# Patient Record
Sex: FEMALE | Race: OTHER | Employment: FULL TIME | URBAN - METROPOLITAN AREA
[De-identification: names, ages, dates, MRNs, and addresses within clinical notes are randomized per-mention and may not be internally consistent; named-entity substitution may affect disease eponyms.]

---

## 2017-07-25 ENCOUNTER — TELEPHONE (OUTPATIENT)
Dept: OBGYN CLINIC | Facility: CLINIC | Age: 32
End: 2017-07-25

## 2017-07-25 NOTE — TELEPHONE ENCOUNTER
Pt is requesting a refill request on her b/c   Pt was up set that because that next available appt at 820am was till sep

## 2017-08-14 DIAGNOSIS — Z30.40 CONTRACEPTIVE SURVEILLANCE: ICD-10-CM

## 2017-08-15 RX ORDER — ETONOGESTREL/ETHINYL ESTRADIOL .12-.015MG
RING, VAGINAL VAGINAL
Qty: 1 EACH | Refills: 0 | Status: SHIPPED | OUTPATIENT
Start: 2017-08-15 | End: 2018-06-27

## 2018-06-26 ENCOUNTER — APPOINTMENT (OUTPATIENT)
Dept: LAB | Facility: HOSPITAL | Age: 33
End: 2018-06-26
Attending: OBSTETRICS & GYNECOLOGY
Payer: COMMERCIAL

## 2018-06-26 ENCOUNTER — TELEPHONE (OUTPATIENT)
Dept: OBGYN CLINIC | Facility: CLINIC | Age: 33
End: 2018-06-26

## 2018-06-26 DIAGNOSIS — O20.9 BLEEDING IN EARLY PREGNANCY: ICD-10-CM

## 2018-06-26 DIAGNOSIS — O20.9 BLEEDING IN EARLY PREGNANCY: Primary | ICD-10-CM

## 2018-06-26 PROCEDURE — 86850 RBC ANTIBODY SCREEN: CPT

## 2018-06-26 PROCEDURE — 84702 CHORIONIC GONADOTROPIN TEST: CPT

## 2018-06-26 PROCEDURE — 86900 BLOOD TYPING SEROLOGIC ABO: CPT

## 2018-06-26 PROCEDURE — 86901 BLOOD TYPING SEROLOGIC RH(D): CPT

## 2018-06-26 PROCEDURE — 36415 COLL VENOUS BLD VENIPUNCTURE: CPT

## 2018-06-26 NOTE — TELEPHONE ENCOUNTER
Pt about 5w2d based on LMP of 5/20/18 and calling to report spotting and cramping today. Pt stated she took a pregnancy test last week and it was positive. Pt stated that spotting started this morning and it was brown in color just when wiping.  Pt stated t

## 2018-06-27 ENCOUNTER — OFFICE VISIT (OUTPATIENT)
Dept: OBGYN CLINIC | Facility: CLINIC | Age: 33
End: 2018-06-27

## 2018-06-27 VITALS
DIASTOLIC BLOOD PRESSURE: 89 MMHG | SYSTOLIC BLOOD PRESSURE: 128 MMHG | BODY MASS INDEX: 40 KG/M2 | WEIGHT: 260 LBS | HEART RATE: 79 BPM

## 2018-06-27 DIAGNOSIS — O02.81 CHEMICAL PREGNANCY: Primary | ICD-10-CM

## 2018-06-27 PROCEDURE — 99212 OFFICE O/P EST SF 10 MIN: CPT | Performed by: OBSTETRICS & GYNECOLOGY

## 2018-06-27 NOTE — PROGRESS NOTES
Judi Nguyễn    6/7/1985       Patient presents with:  Gyn Problem: positive home pregnancy test(light bleeding)  Pt had a +HPT on Friday 6/22/18 and began spotting and cramping yesterday.   Her quant yesterday was only 0.9 and I  Explained that it wa

## 2019-01-21 ENCOUNTER — TELEPHONE (OUTPATIENT)
Dept: OBGYN CLINIC | Facility: CLINIC | Age: 34
End: 2019-01-21

## 2019-01-21 NOTE — TELEPHONE ENCOUNTER
Pt confirms +hpt and LMP 12/8/18. Informed pt of alternating PN appts with all 6 providers, 2 male and 4 female. Pt states she prefers to only see the female doctors and asking if this is possible.  Informed pt not with our group since we have 2 male physic No

## 2019-01-21 NOTE — TELEPHONE ENCOUNTER
PER PT REQUESTING AN APPT / LMP PT DO NOT WANT TO GIVE THAT INFO TO ME / CONFIRMED WITH PREGNANCY TEST / PLS ADV

## 2019-02-09 ENCOUNTER — LAB ENCOUNTER (OUTPATIENT)
Dept: LAB | Facility: HOSPITAL | Age: 34
End: 2019-02-09
Attending: OBSTETRICS & GYNECOLOGY
Payer: COMMERCIAL

## 2019-02-09 ENCOUNTER — NURSE ONLY (OUTPATIENT)
Dept: OBGYN CLINIC | Facility: CLINIC | Age: 34
End: 2019-02-09
Payer: COMMERCIAL

## 2019-02-09 VITALS — BODY MASS INDEX: 39.71 KG/M2 | HEIGHT: 68 IN | WEIGHT: 262 LBS

## 2019-02-09 DIAGNOSIS — Z34.01 ENCOUNTER FOR SUPERVISION OF NORMAL FIRST PREGNANCY IN FIRST TRIMESTER: ICD-10-CM

## 2019-02-09 DIAGNOSIS — Z34.01 ENCOUNTER FOR SUPERVISION OF NORMAL FIRST PREGNANCY IN FIRST TRIMESTER: Primary | ICD-10-CM

## 2019-02-09 LAB
ANTIBODY SCREEN: NEGATIVE
BASOPHILS # BLD AUTO: 0.02 X10(3) UL (ref 0–0.2)
BASOPHILS NFR BLD AUTO: 0.2 %
CONTROL LINE PRESENT WITH A CLEAR BACKGROUND (YES/NO): YES YES/NO
DEPRECATED RDW RBC AUTO: 44.6 FL (ref 35.1–46.3)
EOSINOPHIL # BLD AUTO: 0.04 X10(3) UL (ref 0–0.7)
EOSINOPHIL NFR BLD AUTO: 0.4 %
ERYTHROCYTE [DISTWIDTH] IN BLOOD BY AUTOMATED COUNT: 15.6 % (ref 11–15)
GLUCOSE 1H P 50 G GLC PO SERPL-MCNC: 131 MG/DL
HCT VFR BLD AUTO: 39.3 % (ref 35–48)
HGB BLD-MCNC: 12.3 G/DL (ref 12–16)
IMM GRANULOCYTES # BLD AUTO: 0.04 X10(3) UL (ref 0–1)
IMM GRANULOCYTES NFR BLD: 0.4 %
KIT LOT #: NORMAL NUMERIC
LYMPHOCYTES # BLD AUTO: 1.84 X10(3) UL (ref 1–4)
LYMPHOCYTES NFR BLD AUTO: 18.8 %
MCH RBC QN AUTO: 24.7 PG (ref 26–34)
MCHC RBC AUTO-ENTMCNC: 31.3 G/DL (ref 31–37)
MCV RBC AUTO: 79.1 FL (ref 80–100)
MONOCYTES # BLD AUTO: 0.6 X10(3) UL (ref 0.1–1)
MONOCYTES NFR BLD AUTO: 6.1 %
NEUTROPHILS # BLD AUTO: 7.26 X10 (3) UL (ref 1.5–7.7)
NEUTROPHILS # BLD AUTO: 7.26 X10(3) UL (ref 1.5–7.7)
NEUTROPHILS NFR BLD AUTO: 74.1 %
PLATELET # BLD AUTO: 327 10(3)UL (ref 150–450)
PREGNANCY TEST, URINE: POSITIVE
RBC # BLD AUTO: 4.97 X10(6)UL (ref 3.8–5.3)
RH BLOOD TYPE: POSITIVE
RUBV IGG SER-ACNC: 450.6 IU/ML
WBC # BLD AUTO: 9.8 X10(3) UL (ref 4–11)

## 2019-02-09 PROCEDURE — 87086 URINE CULTURE/COLONY COUNT: CPT

## 2019-02-09 PROCEDURE — 86762 RUBELLA ANTIBODY: CPT

## 2019-02-09 PROCEDURE — 87389 HIV-1 AG W/HIV-1&-2 AB AG IA: CPT

## 2019-02-09 PROCEDURE — 87340 HEPATITIS B SURFACE AG IA: CPT

## 2019-02-09 PROCEDURE — 86780 TREPONEMA PALLIDUM: CPT

## 2019-02-09 PROCEDURE — 86803 HEPATITIS C AB TEST: CPT

## 2019-02-09 PROCEDURE — 82950 GLUCOSE TEST: CPT

## 2019-02-09 PROCEDURE — 36415 COLL VENOUS BLD VENIPUNCTURE: CPT

## 2019-02-09 PROCEDURE — 86900 BLOOD TYPING SEROLOGIC ABO: CPT

## 2019-02-09 PROCEDURE — 85025 COMPLETE CBC W/AUTO DIFF WBC: CPT

## 2019-02-09 PROCEDURE — 86850 RBC ANTIBODY SCREEN: CPT

## 2019-02-09 PROCEDURE — 86901 BLOOD TYPING SEROLOGIC RH(D): CPT

## 2019-02-09 PROCEDURE — 81025 URINE PREGNANCY TEST: CPT | Performed by: OBSTETRICS & GYNECOLOGY

## 2019-02-09 RX ORDER — GLYCERIN/MINERAL OIL
LOTION (ML) TOPICAL
COMMUNITY
End: 2020-10-05

## 2019-02-09 NOTE — PROGRESS NOTES
Pt seen for OBN appt today with no complaints. Normal PN labs ordered, 1 hr gtt and Hep C. Pt advised all labs must be completed and resulted prior to MD appt. Pt walked to  to schedule NPN appt with MD.    UPT positive in the office.      Part genetic or chromosomal disorders No    Patient or baby's father had a child with birth defects not listed above No    Previous miscarriages or stillborn Yes Pt's grandma had a miscarriage       MISC    Infant vaccinations  Yes

## 2019-02-11 LAB
HBV SURFACE AG SERPL QL IA: NONREACTIVE
HCV AB SERPL QL IA: NONREACTIVE
HIV1+2 AB SERPL QL IA: NONREACTIVE
T PALLIDUM AB SER QL: NEGATIVE

## 2019-02-14 ENCOUNTER — INITIAL PRENATAL (OUTPATIENT)
Dept: OBGYN CLINIC | Facility: CLINIC | Age: 34
End: 2019-02-14
Payer: COMMERCIAL

## 2019-02-14 VITALS
WEIGHT: 262 LBS | BODY MASS INDEX: 40 KG/M2 | HEART RATE: 81 BPM | SYSTOLIC BLOOD PRESSURE: 121 MMHG | DIASTOLIC BLOOD PRESSURE: 81 MMHG

## 2019-02-14 DIAGNOSIS — Z34.91 ENCOUNTER FOR SUPERVISION OF NORMAL PREGNANCY IN FIRST TRIMESTER, UNSPECIFIED GRAVIDITY: Primary | ICD-10-CM

## 2019-02-14 LAB
MULTISTIX LOT#: NORMAL NUMERIC
PH, URINE: 8 (ref 4.5–8)
SPECIFIC GRAVITY: 1.01 (ref 1–1.03)

## 2019-02-14 PROCEDURE — 76815 OB US LIMITED FETUS(S): CPT | Performed by: OBSTETRICS & GYNECOLOGY

## 2019-02-14 PROCEDURE — 90686 IIV4 VACC NO PRSV 0.5 ML IM: CPT | Performed by: OBSTETRICS & GYNECOLOGY

## 2019-02-14 PROCEDURE — 90471 IMMUNIZATION ADMIN: CPT | Performed by: OBSTETRICS & GYNECOLOGY

## 2019-02-14 PROCEDURE — 81002 URINALYSIS NONAUTO W/O SCOPE: CPT | Performed by: OBSTETRICS & GYNECOLOGY

## 2019-02-14 NOTE — PROGRESS NOTES
Per JLK, administered Flu on L. Deltoid. Pt tolerated well. VIS given. Advised to call with questions or concerns. Pt verbalized understanding.

## 2019-02-15 LAB
C TRACH DNA SPEC QL NAA+PROBE: NEGATIVE
HPV I/H RISK 1 DNA SPEC QL NAA+PROBE: NEGATIVE
N GONORRHOEA DNA SPEC QL NAA+PROBE: NEGATIVE
T VAGINALIS RRNA SPEC QL NAA+PROBE: NEGATIVE

## 2019-03-14 ENCOUNTER — ROUTINE PRENATAL (OUTPATIENT)
Dept: OBGYN CLINIC | Facility: CLINIC | Age: 34
End: 2019-03-14
Payer: COMMERCIAL

## 2019-03-14 VITALS
HEART RATE: 81 BPM | DIASTOLIC BLOOD PRESSURE: 79 MMHG | SYSTOLIC BLOOD PRESSURE: 122 MMHG | BODY MASS INDEX: 40 KG/M2 | WEIGHT: 261 LBS

## 2019-03-14 DIAGNOSIS — Z34.92 ENCOUNTER FOR SUPERVISION OF NORMAL PREGNANCY IN SECOND TRIMESTER, UNSPECIFIED GRAVIDITY: Primary | ICD-10-CM

## 2019-03-14 LAB
MULTISTIX LOT#: NORMAL NUMERIC
PH, URINE: 6 (ref 4.5–8)
SPECIFIC GRAVITY: 1.03 (ref 1–1.03)
UROBILINOGEN,SEMI-QN: 0.2 MG/DL (ref 0–1.9)

## 2019-03-14 PROCEDURE — 81002 URINALYSIS NONAUTO W/O SCOPE: CPT | Performed by: OBSTETRICS & GYNECOLOGY

## 2019-04-11 ENCOUNTER — TELEPHONE (OUTPATIENT)
Dept: OBGYN CLINIC | Facility: CLINIC | Age: 34
End: 2019-04-11

## 2019-04-11 ENCOUNTER — ROUTINE PRENATAL (OUTPATIENT)
Dept: OBGYN CLINIC | Facility: CLINIC | Age: 34
End: 2019-04-11
Payer: COMMERCIAL

## 2019-04-11 VITALS
DIASTOLIC BLOOD PRESSURE: 77 MMHG | HEART RATE: 76 BPM | BODY MASS INDEX: 40 KG/M2 | WEIGHT: 263 LBS | SYSTOLIC BLOOD PRESSURE: 120 MMHG

## 2019-04-11 DIAGNOSIS — Z34.92 ENCOUNTER FOR SUPERVISION OF NORMAL PREGNANCY IN SECOND TRIMESTER, UNSPECIFIED GRAVIDITY: Primary | ICD-10-CM

## 2019-04-11 PROCEDURE — 81002 URINALYSIS NONAUTO W/O SCOPE: CPT | Performed by: OBSTETRICS & GYNECOLOGY

## 2019-04-12 NOTE — TELEPHONE ENCOUNTER
Lmtcb. Please relay info:    Patient's order for level 2 ultrasound was entered. Patient can call maternal fetal medicine at [phone] to schedule an appointment. Detailed my chart message routed.

## 2019-04-29 ENCOUNTER — HOSPITAL ENCOUNTER (OUTPATIENT)
Dept: PERINATAL CARE | Facility: HOSPITAL | Age: 34
Discharge: HOME | End: 2019-04-29
Attending: OBSTETRICS & GYNECOLOGY
Payer: COMMERCIAL

## 2019-04-29 VITALS
WEIGHT: 263 LBS | HEIGHT: 68 IN | BODY MASS INDEX: 39.86 KG/M2 | SYSTOLIC BLOOD PRESSURE: 127 MMHG | DIASTOLIC BLOOD PRESSURE: 76 MMHG | HEART RATE: 80 BPM

## 2019-04-29 DIAGNOSIS — Z36.3 ENCOUNTER FOR ANTENATAL SCREENING FOR MALFORMATION USING ULTRASOUND: ICD-10-CM

## 2019-04-29 DIAGNOSIS — O99.212 OBESITY AFFECTING PREGNANCY IN SECOND TRIMESTER: ICD-10-CM

## 2019-04-29 PROCEDURE — 76811 OB US DETAILED SNGL FETUS: CPT | Performed by: OBSTETRICS & GYNECOLOGY

## 2019-04-29 PROCEDURE — 99243 OFF/OP CNSLTJ NEW/EST LOW 30: CPT | Performed by: OBSTETRICS & GYNECOLOGY

## 2019-04-29 NOTE — PROGRESS NOTES
Outpatient Maternal-Fetal Medicine Consultation    Dear Dr. Holly Rolon    Thank you for requesting ultrasound evaluation and maternal fetal medicine consultation on your patient Julia Fabio.  As you are aware she is a 29year old female [facility] with a single patient. DISCUSSION  During her visit we discussed and reviewed the following issues:  OBESITY  This patient has obesity; her pre-gravid BMI is: 39  Obesity during pregnancy is associated with numerous maternal and  risks.   It is not clear whet Studies have found that the increased risk of  birth in obese gravidas is primarily associated with obesity-related medical and  complications, rather than an intrinsic predisposition to spontaneous  birth.  Prevention of  bi M.D.    The majority of the time (>50%) was spent in review of records, consultation and coordination of care. Our discussion is summarized above. The approximate physician face-to-face time was 40 minutes.

## 2019-05-10 ENCOUNTER — ROUTINE PRENATAL (OUTPATIENT)
Dept: OBGYN CLINIC | Facility: CLINIC | Age: 34
End: 2019-05-10
Payer: COMMERCIAL

## 2019-05-10 VITALS
BODY MASS INDEX: 40 KG/M2 | WEIGHT: 263.38 LBS | HEART RATE: 74 BPM | DIASTOLIC BLOOD PRESSURE: 82 MMHG | SYSTOLIC BLOOD PRESSURE: 128 MMHG

## 2019-05-10 DIAGNOSIS — Z34.92 ENCOUNTER FOR SUPERVISION OF NORMAL PREGNANCY IN SECOND TRIMESTER, UNSPECIFIED GRAVIDITY: Primary | ICD-10-CM

## 2019-05-10 PROCEDURE — 81002 URINALYSIS NONAUTO W/O SCOPE: CPT | Performed by: OBSTETRICS & GYNECOLOGY

## 2019-05-17 NOTE — PROGRESS NOTES
Magdalena Peralta  Dear Dr. Natividad Meek,     Thank you for requesting ultrasound evaluation and maternal fetal medicine consultation on your patient Judi Nguyễn.  As you are aware she is a 32/30 year old female  with a Single found that initiating moderate exercise in early pregnancy for obese gravidas significantly reduced the incidence of gestational diabetes, gestational hypertension,  deliveries and C-sections.       IMPRESSION:  · IUP at 23w6d  · Obesity (BMI 39)  ·

## 2019-05-24 ENCOUNTER — HOSPITAL ENCOUNTER (OUTPATIENT)
Dept: PERINATAL CARE | Facility: HOSPITAL | Age: 34
Discharge: HOME | End: 2019-05-24
Attending: OBSTETRICS & GYNECOLOGY
Payer: COMMERCIAL

## 2019-05-24 VITALS
DIASTOLIC BLOOD PRESSURE: 71 MMHG | SYSTOLIC BLOOD PRESSURE: 123 MMHG | BODY MASS INDEX: 40 KG/M2 | WEIGHT: 264 LBS | HEART RATE: 72 BPM

## 2019-05-24 DIAGNOSIS — O99.212 OBESITY AFFECTING PREGNANCY IN SECOND TRIMESTER: ICD-10-CM

## 2019-05-24 DIAGNOSIS — O99.212 OBESITY AFFECTING PREGNANCY IN SECOND TRIMESTER: Primary | ICD-10-CM

## 2019-05-24 PROCEDURE — 76825 ECHO EXAM OF FETAL HEART: CPT | Performed by: OBSTETRICS & GYNECOLOGY

## 2019-05-24 PROCEDURE — 76827 ECHO EXAM OF FETAL HEART: CPT | Performed by: OBSTETRICS & GYNECOLOGY

## 2019-05-24 PROCEDURE — 99214 OFFICE O/P EST MOD 30 MIN: CPT | Performed by: OBSTETRICS & GYNECOLOGY

## 2019-05-24 PROCEDURE — 93325 DOPPLER ECHO COLOR FLOW MAPG: CPT | Performed by: OBSTETRICS & GYNECOLOGY

## 2019-05-24 NOTE — ADDENDUM NOTE
Encounter addended by: Anastasia Zimmer on: 5/24/2019 10:44 AM   Actions taken: Charge Capture section accepted

## 2019-06-06 ENCOUNTER — ROUTINE PRENATAL (OUTPATIENT)
Dept: OBGYN CLINIC | Facility: CLINIC | Age: 34
End: 2019-06-06
Payer: COMMERCIAL

## 2019-06-06 VITALS
BODY MASS INDEX: 40 KG/M2 | SYSTOLIC BLOOD PRESSURE: 124 MMHG | HEART RATE: 76 BPM | DIASTOLIC BLOOD PRESSURE: 79 MMHG | WEIGHT: 265 LBS

## 2019-06-06 DIAGNOSIS — Z34.92 ENCOUNTER FOR SUPERVISION OF NORMAL PREGNANCY IN SECOND TRIMESTER, UNSPECIFIED GRAVIDITY: Primary | ICD-10-CM

## 2019-06-06 PROCEDURE — 81002 URINALYSIS NONAUTO W/O SCOPE: CPT | Performed by: OBSTETRICS & GYNECOLOGY

## 2019-06-06 NOTE — PROGRESS NOTES
No issues. Normal fetal echo. [facility] recs growth at 32 wks and NSTs at 36 wks. Has 2T labs. RTC @ 28 wks.

## 2019-06-08 ENCOUNTER — APPOINTMENT (OUTPATIENT)
Dept: LAB | Facility: HOSPITAL | Age: 34
End: 2019-06-08
Attending: OBSTETRICS & GYNECOLOGY
Payer: COMMERCIAL

## 2019-06-08 DIAGNOSIS — Z34.92 ENCOUNTER FOR SUPERVISION OF NORMAL PREGNANCY IN SECOND TRIMESTER, UNSPECIFIED GRAVIDITY: ICD-10-CM

## 2019-06-08 PROCEDURE — 36415 COLL VENOUS BLD VENIPUNCTURE: CPT

## 2019-06-08 PROCEDURE — 85027 COMPLETE CBC AUTOMATED: CPT

## 2019-06-08 PROCEDURE — 82950 GLUCOSE TEST: CPT

## 2019-06-18 ENCOUNTER — ROUTINE PRENATAL (OUTPATIENT)
Dept: OBGYN CLINIC | Facility: CLINIC | Age: 34
End: 2019-06-18
Payer: COMMERCIAL

## 2019-06-18 VITALS
HEART RATE: 73 BPM | BODY MASS INDEX: 40 KG/M2 | SYSTOLIC BLOOD PRESSURE: 109 MMHG | DIASTOLIC BLOOD PRESSURE: 73 MMHG | WEIGHT: 266 LBS

## 2019-06-18 DIAGNOSIS — Z34.82 ENCOUNTER FOR SUPERVISION OF OTHER NORMAL PREGNANCY IN SECOND TRIMESTER: Primary | ICD-10-CM

## 2019-06-18 PROBLEM — O99.212 OBESITY AFFECTING PREGNANCY IN SECOND TRIMESTER: Status: RESOLVED | Noted: 2019-04-29 | Resolved: 2019-06-18

## 2019-06-18 PROBLEM — O99.212 OBESITY AFFECTING PREGNANCY IN SECOND TRIMESTER (HCC): Status: RESOLVED | Noted: 2019-04-29 | Resolved: 2019-06-18

## 2019-06-18 PROCEDURE — 90715 TDAP VACCINE 7 YRS/> IM: CPT | Performed by: OBSTETRICS & GYNECOLOGY

## 2019-06-18 PROCEDURE — 90471 IMMUNIZATION ADMIN: CPT | Performed by: OBSTETRICS & GYNECOLOGY

## 2019-06-18 PROCEDURE — 81002 URINALYSIS NONAUTO W/O SCOPE: CPT | Performed by: OBSTETRICS & GYNECOLOGY

## 2019-06-18 NOTE — PROGRESS NOTES
Tdap vaccine administered to pts right deltoid. Pt tolerated injection well. VIS info sheet regarding Tdap given to pt and encouraged to call with any questions or concerns.

## 2019-07-08 ENCOUNTER — ROUTINE PRENATAL (OUTPATIENT)
Dept: OBGYN CLINIC | Facility: CLINIC | Age: 34
End: 2019-07-08
Payer: COMMERCIAL

## 2019-07-08 VITALS
DIASTOLIC BLOOD PRESSURE: 77 MMHG | SYSTOLIC BLOOD PRESSURE: 115 MMHG | HEART RATE: 75 BPM | BODY MASS INDEX: 41 KG/M2 | WEIGHT: 269 LBS

## 2019-07-08 DIAGNOSIS — Z34.93 ENCOUNTER FOR SUPERVISION OF NORMAL PREGNANCY IN THIRD TRIMESTER, UNSPECIFIED GRAVIDITY: Primary | ICD-10-CM

## 2019-07-08 DIAGNOSIS — O99.213 OBESITY AFFECTING PREGNANCY IN THIRD TRIMESTER: ICD-10-CM

## 2019-07-08 PROBLEM — O99.013 ANEMIA DURING PREGNANCY IN THIRD TRIMESTER (HCC): Status: ACTIVE | Noted: 2019-07-08

## 2019-07-08 PROBLEM — O99.013 ANEMIA DURING PREGNANCY IN THIRD TRIMESTER: Status: ACTIVE | Noted: 2019-07-08

## 2019-07-08 LAB
APPEARANCE: CLEAR
MULTISTIX LOT#: NORMAL NUMERIC
SPECIFIC GRAVITY: 1.02 (ref 1–1.03)
URINE-COLOR: YELLOW

## 2019-07-08 PROCEDURE — 81002 URINALYSIS NONAUTO W/O SCOPE: CPT | Performed by: OBSTETRICS & GYNECOLOGY

## 2019-07-17 NOTE — PROGRESS NOTES
Aileen Bautista    Dear Dr. Alise Gibson    Thank you for requesting an ultrasound and maternal-fetal medicine consultation on your patient Catarina Diamond.  As you are aware she is a 36year old female [facility] with a reed pregna

## 2019-07-22 ENCOUNTER — HOSPITAL ENCOUNTER (OUTPATIENT)
Dept: PERINATAL CARE | Facility: HOSPITAL | Age: 34
Discharge: HOME | End: 2019-07-22
Attending: OBSTETRICS & GYNECOLOGY
Payer: COMMERCIAL

## 2019-07-22 VITALS
HEIGHT: 68 IN | BODY MASS INDEX: 40.77 KG/M2 | SYSTOLIC BLOOD PRESSURE: 114 MMHG | DIASTOLIC BLOOD PRESSURE: 74 MMHG | HEART RATE: 80 BPM | WEIGHT: 269 LBS

## 2019-07-22 DIAGNOSIS — O99.213 OBESITY AFFECTING PREGNANCY IN THIRD TRIMESTER: Primary | ICD-10-CM

## 2019-07-22 DIAGNOSIS — O99.213 OBESITY AFFECTING PREGNANCY IN THIRD TRIMESTER: ICD-10-CM

## 2019-07-22 PROCEDURE — 76819 FETAL BIOPHYS PROFIL W/O NST: CPT | Performed by: OBSTETRICS & GYNECOLOGY

## 2019-07-22 PROCEDURE — 76816 OB US FOLLOW-UP PER FETUS: CPT | Performed by: OBSTETRICS & GYNECOLOGY

## 2019-07-22 PROCEDURE — 99213 OFFICE O/P EST LOW 20 MIN: CPT | Performed by: OBSTETRICS & GYNECOLOGY

## 2019-07-22 NOTE — ADDENDUM NOTE
Encounter addended by: Henna Vazquez on: 7/22/2019 4:18 PM   Actions taken: Charge Capture section accepted

## 2019-07-23 ENCOUNTER — ROUTINE PRENATAL (OUTPATIENT)
Dept: OBGYN CLINIC | Facility: CLINIC | Age: 34
End: 2019-07-23
Payer: COMMERCIAL

## 2019-07-23 ENCOUNTER — TELEPHONE (OUTPATIENT)
Dept: OBGYN CLINIC | Facility: CLINIC | Age: 34
End: 2019-07-23

## 2019-07-23 VITALS
SYSTOLIC BLOOD PRESSURE: 109 MMHG | DIASTOLIC BLOOD PRESSURE: 76 MMHG | WEIGHT: 270 LBS | HEART RATE: 76 BPM | BODY MASS INDEX: 41 KG/M2

## 2019-07-23 DIAGNOSIS — Z34.93 ENCOUNTER FOR SUPERVISION OF NORMAL PREGNANCY IN THIRD TRIMESTER, UNSPECIFIED GRAVIDITY: Primary | ICD-10-CM

## 2019-07-23 LAB
APPEARANCE: CLEAR
MULTISTIX LOT#: NORMAL NUMERIC
PH, URINE: 7 (ref 4.5–8)
SPECIFIC GRAVITY: 1 (ref 1–1.03)
URINE-COLOR: YELLOW

## 2019-07-23 PROCEDURE — 81002 URINALYSIS NONAUTO W/O SCOPE: CPT | Performed by: OBSTETRICS & GYNECOLOGY

## 2019-07-23 NOTE — TELEPHONE ENCOUNTER
Forms dropped off for completion for [facility]  FCR/MAYANK have been signed  Payment has been received    Please fax/ pt will  originals

## 2019-07-23 NOTE — PROGRESS NOTES
No complaints. Contractions yesterday about 2 an hour but better today. Reviewed kick counts and PTL precautions.    RTC 2 wks

## 2019-07-24 NOTE — TELEPHONE ENCOUNTER
[facility] form for Dr. Melanie Mariano received in Forms dept+ FCR+ Signed release, paid $25 w/ . Logged for processing.  NK

## 2019-07-31 ENCOUNTER — TELEPHONE (OUTPATIENT)
Dept: OBGYN CLINIC | Facility: CLINIC | Age: 34
End: 2019-07-31

## 2019-07-31 NOTE — TELEPHONE ENCOUNTER
Received order form form [employer] for breast pump. Order form completed and [facility]. Completed order form sent to scanning.

## 2019-08-05 ENCOUNTER — ROUTINE PRENATAL (OUTPATIENT)
Dept: OBGYN CLINIC | Facility: CLINIC | Age: 34
End: 2019-08-05
Payer: COMMERCIAL

## 2019-08-05 VITALS
DIASTOLIC BLOOD PRESSURE: 77 MMHG | WEIGHT: 273 LBS | SYSTOLIC BLOOD PRESSURE: 112 MMHG | HEART RATE: 70 BPM | BODY MASS INDEX: 42 KG/M2

## 2019-08-05 DIAGNOSIS — Z34.93 ENCOUNTER FOR SUPERVISION OF NORMAL PREGNANCY IN THIRD TRIMESTER, UNSPECIFIED GRAVIDITY: Primary | ICD-10-CM

## 2019-08-05 LAB
APPEARANCE: CLEAR
MULTISTIX LOT#: NORMAL NUMERIC
PH, URINE: 6 (ref 4.5–8)
SPECIFIC GRAVITY: 1.01 (ref 1–1.03)
URINE-COLOR: YELLOW
UROBILINOGEN,SEMI-QN: 0.2 MG/DL (ref 0–1.9)

## 2019-08-05 PROCEDURE — 81002 URINALYSIS NONAUTO W/O SCOPE: CPT | Performed by: OBSTETRICS & GYNECOLOGY

## 2019-08-05 NOTE — TELEPHONE ENCOUNTER
Dr. Guy Sutherland,    Please sign off on form: [address] the patient and hit \"Chart\" button. -In Chart Review, w/in the Encounter tab - click 1 time on the Telephone call encounter for 7/23/19. Scroll down the telephone encounter.

## 2019-08-10 ENCOUNTER — LAB ENCOUNTER (OUTPATIENT)
Dept: LAB | Facility: HOSPITAL | Age: 34
End: 2019-08-10
Attending: OBSTETRICS & GYNECOLOGY
Payer: COMMERCIAL

## 2019-08-10 DIAGNOSIS — Z34.93 ENCOUNTER FOR SUPERVISION OF NORMAL PREGNANCY IN THIRD TRIMESTER, UNSPECIFIED GRAVIDITY: ICD-10-CM

## 2019-08-10 LAB
DEPRECATED RDW RBC AUTO: 45.1 FL (ref 35.1–46.3)
ERYTHROCYTE [DISTWIDTH] IN BLOOD BY AUTOMATED COUNT: 16.2 % (ref 11–15)
HCT VFR BLD AUTO: 35.5 % (ref 35–48)
HGB BLD-MCNC: 11.2 G/DL (ref 12–16)
MCH RBC QN AUTO: 24.4 PG (ref 26–34)
MCHC RBC AUTO-ENTMCNC: 31.5 G/DL (ref 31–37)
MCV RBC AUTO: 77.3 FL (ref 80–100)
PLATELET # BLD AUTO: 272 10(3)UL (ref 150–450)
RBC # BLD AUTO: 4.59 X10(6)UL (ref 3.8–5.3)
WBC # BLD AUTO: 13.1 X10(3) UL (ref 4–11)

## 2019-08-10 PROCEDURE — 36415 COLL VENOUS BLD VENIPUNCTURE: CPT

## 2019-08-10 PROCEDURE — 85027 COMPLETE CBC AUTOMATED: CPT

## 2019-08-10 PROCEDURE — 87389 HIV-1 AG W/HIV-1&-2 AB AG IA: CPT

## 2019-08-10 PROCEDURE — 86780 TREPONEMA PALLIDUM: CPT

## 2019-08-12 LAB — T PALLIDUM AB SER QL: NEGATIVE

## 2019-08-19 ENCOUNTER — ROUTINE PRENATAL (OUTPATIENT)
Dept: OBGYN CLINIC | Facility: CLINIC | Age: 34
End: 2019-08-19
Payer: COMMERCIAL

## 2019-08-19 ENCOUNTER — APPOINTMENT (OUTPATIENT)
Dept: LAB | Facility: HOSPITAL | Age: 34
End: 2019-08-19
Attending: OBSTETRICS & GYNECOLOGY
Payer: COMMERCIAL

## 2019-08-19 ENCOUNTER — HOSPITAL ENCOUNTER (OUTPATIENT)
Facility: HOSPITAL | Age: 34
Discharge: HOME | End: 2019-08-19
Attending: OBSTETRICS & GYNECOLOGY | Admitting: OBSTETRICS & GYNECOLOGY
Payer: COMMERCIAL

## 2019-08-19 ENCOUNTER — APPOINTMENT (OUTPATIENT)
Dept: OBGYN CLINIC | Facility: HOSPITAL | Age: 34
End: 2019-08-19
Attending: OBSTETRICS & GYNECOLOGY
Payer: COMMERCIAL

## 2019-08-19 VITALS
WEIGHT: 276.63 LBS | HEART RATE: 76 BPM | BODY MASS INDEX: 42 KG/M2 | SYSTOLIC BLOOD PRESSURE: 118 MMHG | DIASTOLIC BLOOD PRESSURE: 78 MMHG

## 2019-08-19 VITALS — SYSTOLIC BLOOD PRESSURE: 112 MMHG | DIASTOLIC BLOOD PRESSURE: 75 MMHG | HEART RATE: 68 BPM

## 2019-08-19 DIAGNOSIS — O99.213 OBESITY AFFECTING PREGNANCY IN THIRD TRIMESTER: ICD-10-CM

## 2019-08-19 DIAGNOSIS — L29.9 ITCHING: ICD-10-CM

## 2019-08-19 DIAGNOSIS — Z34.93 ENCOUNTER FOR SUPERVISION OF NORMAL PREGNANCY IN THIRD TRIMESTER, UNSPECIFIED GRAVIDITY: ICD-10-CM

## 2019-08-19 DIAGNOSIS — Z34.93 ENCOUNTER FOR SUPERVISION OF NORMAL PREGNANCY IN THIRD TRIMESTER, UNSPECIFIED GRAVIDITY: Primary | ICD-10-CM

## 2019-08-19 LAB
ALBUMIN SERPL-MCNC: 2.4 G/DL (ref 3.4–5)
ALP LIVER SERPL-CCNC: 280 U/L (ref 37–98)
ALT SERPL-CCNC: 20 U/L (ref 13–56)
APPEARANCE: CLEAR
AST SERPL-CCNC: 23 U/L (ref 15–37)
BILIRUB DIRECT SERPL-MCNC: 0.1 MG/DL (ref 0–0.2)
BILIRUB SERPL-MCNC: 0.3 MG/DL (ref 0.1–2)
M PROTEIN MFR SERPL ELPH: 6.6 G/DL (ref 6.4–8.2)
MULTISTIX LOT#: NORMAL NUMERIC
PH, URINE: 6 (ref 4.5–8)
PROTEIN (URINE DIPSTICK): 30 MG/DL
SPECIFIC GRAVITY: 1.03 (ref 1–1.03)
UROBILINOGEN,SEMI-QN: 0 MG/DL (ref 0–1.9)

## 2019-08-19 PROCEDURE — 80076 HEPATIC FUNCTION PANEL: CPT

## 2019-08-19 PROCEDURE — 36415 COLL VENOUS BLD VENIPUNCTURE: CPT

## 2019-08-19 PROCEDURE — 81002 URINALYSIS NONAUTO W/O SCOPE: CPT | Performed by: OBSTETRICS & GYNECOLOGY

## 2019-08-19 PROCEDURE — 59025 FETAL NON-STRESS TEST: CPT | Performed by: OBSTETRICS & GYNECOLOGY

## 2019-08-19 PROCEDURE — 82239 BILE ACIDS TOTAL: CPT

## 2019-08-19 NOTE — NST
Nonstress Test   Patient: Dorlucy Ege    Gestation: 36w2d    NST:High BMI       Variability: Moderate           Accelerations: Yes           Decelerations: None            Baseline: 145 BPM           Uterine Irritability: No           Contractions: Ir

## 2019-08-19 NOTE — PROGRESS NOTES
Brother  unexpectedly over the weekend. Discussed Choco Salinas for cousnseling prn. Hand and foot itching itermittently x 1 week. LFTs and Bile acids ordreed. Patient fasting this AM. NST tonight. GBS collected. RTC 1 wk.

## 2019-08-20 LAB — BILE ACIDS, TOTAL: 4 UMOL/L

## 2019-08-26 ENCOUNTER — HOSPITAL ENCOUNTER (OUTPATIENT)
Facility: HOSPITAL | Age: 34
Discharge: HOME | End: 2019-08-26
Attending: OBSTETRICS & GYNECOLOGY | Admitting: OBSTETRICS & GYNECOLOGY
Payer: COMMERCIAL

## 2019-08-26 ENCOUNTER — ROUTINE PRENATAL (OUTPATIENT)
Dept: OBGYN CLINIC | Facility: CLINIC | Age: 34
End: 2019-08-26
Payer: COMMERCIAL

## 2019-08-26 ENCOUNTER — APPOINTMENT (OUTPATIENT)
Dept: OBGYN CLINIC | Facility: HOSPITAL | Age: 34
End: 2019-08-26
Payer: COMMERCIAL

## 2019-08-26 VITALS — BODY MASS INDEX: 42 KG/M2 | DIASTOLIC BLOOD PRESSURE: 81 MMHG | WEIGHT: 277 LBS | SYSTOLIC BLOOD PRESSURE: 129 MMHG

## 2019-08-26 DIAGNOSIS — Z34.93 NORMAL PREGNANCY IN THIRD TRIMESTER: Primary | ICD-10-CM

## 2019-08-26 PROBLEM — Z63.79 STRESSFUL LIFE EVENT AFFECTING FAMILY: Status: ACTIVE | Noted: 2019-08-26

## 2019-08-26 LAB
APPEARANCE: CLEAR
MULTISTIX LOT#: NORMAL NUMERIC
PH, URINE: 8 (ref 4.5–8)
SPECIFIC GRAVITY: 1.02 (ref 1–1.03)
URINE-COLOR: YELLOW
UROBILINOGEN,SEMI-QN: 0.2 MG/DL (ref 0–1.9)

## 2019-08-26 PROCEDURE — 59025 FETAL NON-STRESS TEST: CPT | Performed by: OBSTETRICS & GYNECOLOGY

## 2019-08-26 PROCEDURE — 81002 URINALYSIS NONAUTO W/O SCOPE: CPT | Performed by: OBSTETRICS & GYNECOLOGY

## 2019-08-26 NOTE — PROGRESS NOTES
Pt is a 36year old female admitted to TR4/TR4-A. Patient presents with:  Non-stress Test: for high BMI     Pt is  37w2d intra-uterine pregnancy. History obtained, consents signed. Oriented to room, staff, and plan of care.

## 2019-08-26 NOTE — NST
Nonstress Test   Patient: Krys Lo    Gestation: 37w2d    NST: for high BMI       Variability: Moderate           Accelerations: Yes           Decelerations: None            Baseline: 125 BPM           Uterine Irritability: Yes

## 2019-09-03 ENCOUNTER — APPOINTMENT (OUTPATIENT)
Dept: OBGYN CLINIC | Facility: HOSPITAL | Age: 34
End: 2019-09-03
Payer: COMMERCIAL

## 2019-09-03 ENCOUNTER — ROUTINE PRENATAL (OUTPATIENT)
Dept: OBGYN CLINIC | Facility: CLINIC | Age: 34
End: 2019-09-03
Payer: COMMERCIAL

## 2019-09-03 ENCOUNTER — HOSPITAL ENCOUNTER (OUTPATIENT)
Facility: HOSPITAL | Age: 34
Discharge: HOME | End: 2019-09-03
Attending: OBSTETRICS & GYNECOLOGY | Admitting: OBSTETRICS & GYNECOLOGY
Payer: COMMERCIAL

## 2019-09-03 VITALS
WEIGHT: 277 LBS | DIASTOLIC BLOOD PRESSURE: 78 MMHG | SYSTOLIC BLOOD PRESSURE: 115 MMHG | BODY MASS INDEX: 42 KG/M2 | HEART RATE: 83 BPM

## 2019-09-03 DIAGNOSIS — Z34.93 ENCOUNTER FOR SUPERVISION OF NORMAL PREGNANCY IN THIRD TRIMESTER, UNSPECIFIED GRAVIDITY: Primary | ICD-10-CM

## 2019-09-03 LAB
APPEARANCE: CLEAR
MULTISTIX LOT#: NORMAL NUMERIC
PH, URINE: 6 (ref 4.5–8)
SPECIFIC GRAVITY: 1.01 (ref 1–1.03)
URINE-COLOR: YELLOW

## 2019-09-03 PROCEDURE — 59025 FETAL NON-STRESS TEST: CPT | Performed by: OBSTETRICS & GYNECOLOGY

## 2019-09-03 PROCEDURE — 81002 URINALYSIS NONAUTO W/O SCOPE: CPT | Performed by: OBSTETRICS & GYNECOLOGY

## 2019-09-03 NOTE — NST
Nonstress Test   Patient: Dorlucy Ege    Gestation: 38w3d    NST: High BMI       Variability: Moderate           Accelerations: Yes           Decelerations: None            Baseline: 125 BPM           Uterine Irritability: No           Contractions: I

## 2019-09-09 ENCOUNTER — APPOINTMENT (OUTPATIENT)
Dept: OBGYN CLINIC | Facility: HOSPITAL | Age: 34
End: 2019-09-09
Payer: COMMERCIAL

## 2019-09-09 ENCOUNTER — HOSPITAL ENCOUNTER (OUTPATIENT)
Facility: HOSPITAL | Age: 34
Discharge: HOME | End: 2019-09-09
Attending: OBSTETRICS & GYNECOLOGY | Admitting: OBSTETRICS & GYNECOLOGY
Payer: COMMERCIAL

## 2019-09-09 ENCOUNTER — ROUTINE PRENATAL (OUTPATIENT)
Dept: OBGYN CLINIC | Facility: CLINIC | Age: 34
End: 2019-09-09
Payer: COMMERCIAL

## 2019-09-09 VITALS
DIASTOLIC BLOOD PRESSURE: 79 MMHG | BODY MASS INDEX: 42 KG/M2 | WEIGHT: 278 LBS | SYSTOLIC BLOOD PRESSURE: 119 MMHG | HEART RATE: 66 BPM

## 2019-09-09 VITALS — SYSTOLIC BLOOD PRESSURE: 126 MMHG | DIASTOLIC BLOOD PRESSURE: 76 MMHG | HEART RATE: 69 BPM

## 2019-09-09 DIAGNOSIS — Z34.93 ENCOUNTER FOR SUPERVISION OF NORMAL PREGNANCY IN THIRD TRIMESTER, UNSPECIFIED GRAVIDITY: Primary | ICD-10-CM

## 2019-09-09 DIAGNOSIS — E66.01 MORBID OBESITY WITH BODY MASS INDEX (BMI) OF 40.0 OR HIGHER (HCC): ICD-10-CM

## 2019-09-09 LAB
APPEARANCE: CLEAR
MULTISTIX LOT#: NORMAL NUMERIC
URINE-COLOR: YELLOW

## 2019-09-09 PROCEDURE — 59025 FETAL NON-STRESS TEST: CPT | Performed by: OBSTETRICS & GYNECOLOGY

## 2019-09-09 PROCEDURE — 81002 URINALYSIS NONAUTO W/O SCOPE: CPT | Performed by: OBSTETRICS & GYNECOLOGY

## 2019-09-09 NOTE — PROGRESS NOTES
Pt is a 36year old female admitted to TR5/TR5-A. Patient presents with:  Non-stress Test: for High BMI     Pt is  39w2d intra-uterine pregnancy. History obtained, consents signed. Oriented to room, staff, and plan of care.

## 2019-09-12 ENCOUNTER — ANESTHESIA EVENT (OUTPATIENT)
Dept: OBGYN UNIT | Facility: HOSPITAL | Age: 34
End: 2019-09-12
Payer: COMMERCIAL

## 2019-09-12 ENCOUNTER — ANESTHESIA (OUTPATIENT)
Dept: OBGYN UNIT | Facility: HOSPITAL | Age: 34
End: 2019-09-12
Payer: COMMERCIAL

## 2019-09-12 ENCOUNTER — HOSPITAL ENCOUNTER (INPATIENT)
Facility: HOSPITAL | Age: 34
LOS: 4 days | Discharge: HOME | End: 2019-09-16
Attending: OBSTETRICS & GYNECOLOGY | Admitting: OBSTETRICS & GYNECOLOGY
Payer: COMMERCIAL

## 2019-09-12 PROBLEM — Z34.90 PREGNANCY: Status: ACTIVE | Noted: 2019-09-12

## 2019-09-12 PROBLEM — Z34.90 PREGNANCY (HCC): Status: ACTIVE | Noted: 2019-09-12

## 2019-09-12 LAB
ANTIBODY SCREEN: NEGATIVE
BASOPHILS # BLD AUTO: 0.04 X10(3) UL (ref 0–0.2)
BASOPHILS NFR BLD AUTO: 0.3 %
DEPRECATED RDW RBC AUTO: 44.4 FL (ref 35.1–46.3)
EOSINOPHIL # BLD AUTO: 0.06 X10(3) UL (ref 0–0.7)
EOSINOPHIL NFR BLD AUTO: 0.4 %
ERYTHROCYTE [DISTWIDTH] IN BLOOD BY AUTOMATED COUNT: 16.8 % (ref 11–15)
HCT VFR BLD AUTO: 30.5 % (ref 35–48)
HCT VFR BLD AUTO: 38.4 % (ref 35–48)
HGB BLD-MCNC: 12.4 G/DL (ref 12–16)
HGB BLD-MCNC: 9.6 G/DL (ref 12–16)
IMM GRANULOCYTES # BLD AUTO: 0.1 X10(3) UL (ref 0–1)
IMM GRANULOCYTES NFR BLD: 0.7 %
LYMPHOCYTES # BLD AUTO: 2.57 X10(3) UL (ref 1–4)
LYMPHOCYTES NFR BLD AUTO: 18.6 %
MCH RBC QN AUTO: 24.3 PG (ref 26–34)
MCHC RBC AUTO-ENTMCNC: 32.3 G/DL (ref 31–37)
MCV RBC AUTO: 75.1 FL (ref 80–100)
MONOCYTES # BLD AUTO: 0.92 X10(3) UL (ref 0.1–1)
MONOCYTES NFR BLD AUTO: 6.7 %
NEUTROPHILS # BLD AUTO: 10.14 X10 (3) UL (ref 1.5–7.7)
NEUTROPHILS # BLD AUTO: 10.14 X10(3) UL (ref 1.5–7.7)
NEUTROPHILS NFR BLD AUTO: 73.3 %
PLATELET # BLD AUTO: 260 10(3)UL (ref 150–450)
RBC # BLD AUTO: 5.11 X10(6)UL (ref 3.8–5.3)
RH BLOOD TYPE: POSITIVE
WBC # BLD AUTO: 13.8 X10(3) UL (ref 4–11)

## 2019-09-12 PROCEDURE — 3E033VJ INTRODUCTION OF OTHER HORMONE INTO PERIPHERAL VEIN, PERCUTANEOUS APPROACH: ICD-10-PCS | Performed by: OBSTETRICS & GYNECOLOGY

## 2019-09-12 RX ORDER — EPHEDRINE SULFATE/0.9% NACL/PF 25 MG/5 ML
5 SYRINGE (ML) INTRAVENOUS AS NEEDED
Status: DISCONTINUED | OUTPATIENT
Start: 2019-09-12 | End: 2019-09-12

## 2019-09-12 RX ORDER — SODIUM CHLORIDE 0.9 % (FLUSH) 0.9 %
10 SYRINGE (ML) INJECTION AS NEEDED
Status: DISCONTINUED | OUTPATIENT
Start: 2019-09-12 | End: 2019-09-16

## 2019-09-12 RX ORDER — IBUPROFEN 600 MG/1
600 TABLET ORAL ONCE AS NEEDED
Status: DISCONTINUED | OUTPATIENT
Start: 2019-09-12 | End: 2019-09-12

## 2019-09-12 RX ORDER — AMMONIA INHALANTS 0.04 G/.3ML
0.3 INHALANT RESPIRATORY (INHALATION) AS NEEDED
Status: DISCONTINUED | OUTPATIENT
Start: 2019-09-12 | End: 2019-09-12 | Stop reason: HOSPADM

## 2019-09-12 RX ORDER — FAMOTIDINE 10 MG/ML
20 INJECTION, SOLUTION INTRAVENOUS ONCE
Status: COMPLETED | OUTPATIENT
Start: 2019-09-12 | End: 2019-09-12

## 2019-09-12 RX ORDER — LIDOCAINE HYDROCHLORIDE 10 MG/ML
INJECTION, SOLUTION INFILTRATION; PERINEURAL
Status: COMPLETED | OUTPATIENT
Start: 2019-09-12 | End: 2019-09-12

## 2019-09-12 RX ORDER — TRISODIUM CITRATE DIHYDRATE AND CITRIC ACID MONOHYDRATE 500; 334 MG/5ML; MG/5ML
30 SOLUTION ORAL AS NEEDED
Status: COMPLETED | OUTPATIENT
Start: 2019-09-12 | End: 2019-09-12

## 2019-09-12 RX ORDER — LIDOCAINE HYDROCHLORIDE AND EPINEPHRINE 15; 5 MG/ML; UG/ML
INJECTION, SOLUTION EPIDURAL
Status: COMPLETED | OUTPATIENT
Start: 2019-09-12 | End: 2019-09-12

## 2019-09-12 RX ORDER — DEXTROSE, SODIUM CHLORIDE, SODIUM LACTATE, POTASSIUM CHLORIDE, AND CALCIUM CHLORIDE 5; .6; .31; .03; .02 G/100ML; G/100ML; G/100ML; G/100ML; G/100ML
INJECTION, SOLUTION INTRAVENOUS CONTINUOUS
Status: DISCONTINUED | OUTPATIENT
Start: 2019-09-12 | End: 2019-09-16

## 2019-09-12 RX ORDER — MIDAZOLAM HYDROCHLORIDE 1 MG/ML
INJECTION INTRAMUSCULAR; INTRAVENOUS AS NEEDED
Status: DISCONTINUED | OUTPATIENT
Start: 2019-09-12 | End: 2019-09-12 | Stop reason: SURG

## 2019-09-12 RX ORDER — POLYETHYLENE GLYCOL 3350 17 G/17G
17 POWDER, FOR SOLUTION ORAL DAILY PRN
Status: DISCONTINUED | OUTPATIENT
Start: 2019-09-12 | End: 2019-09-16

## 2019-09-12 RX ORDER — ACETAMINOPHEN 325 MG/1
650 TABLET ORAL EVERY 6 HOURS PRN
Status: ACTIVE | OUTPATIENT
Start: 2019-09-12 | End: 2019-09-13

## 2019-09-12 RX ORDER — ONDANSETRON 2 MG/ML
4 INJECTION INTRAMUSCULAR; INTRAVENOUS EVERY 6 HOURS PRN
Status: DISCONTINUED | OUTPATIENT
Start: 2019-09-12 | End: 2019-09-16

## 2019-09-12 RX ORDER — HALOPERIDOL 5 MG/ML
0.5 INJECTION INTRAMUSCULAR ONCE AS NEEDED
Status: ACTIVE | OUTPATIENT
Start: 2019-09-12 | End: 2019-09-12

## 2019-09-12 RX ORDER — KETOROLAC TROMETHAMINE 30 MG/ML
30 INJECTION, SOLUTION INTRAMUSCULAR; INTRAVENOUS EVERY 6 HOURS
Status: DISCONTINUED | OUTPATIENT
Start: 2019-09-12 | End: 2019-09-14

## 2019-09-12 RX ORDER — DIPHENHYDRAMINE HCL 25 MG
25 CAPSULE ORAL EVERY 4 HOURS PRN
Status: ACTIVE | OUTPATIENT
Start: 2019-09-12 | End: 2019-09-13

## 2019-09-12 RX ORDER — TERBUTALINE SULFATE 1 MG/ML
0.25 INJECTION, SOLUTION SUBCUTANEOUS AS NEEDED
Status: DISCONTINUED | OUTPATIENT
Start: 2019-09-12 | End: 2019-09-12 | Stop reason: HOSPADM

## 2019-09-12 RX ORDER — BISACODYL 10 MG
10 SUPPOSITORY, RECTAL RECTAL
Status: DISCONTINUED | OUTPATIENT
Start: 2019-09-12 | End: 2019-09-16

## 2019-09-12 RX ORDER — IBUPROFEN 600 MG/1
600 TABLET ORAL EVERY 6 HOURS PRN
Status: DISCONTINUED | OUTPATIENT
Start: 2019-09-14 | End: 2019-09-14 | Stop reason: ALTCHOICE

## 2019-09-12 RX ORDER — METOCLOPRAMIDE HYDROCHLORIDE 5 MG/ML
10 INJECTION INTRAMUSCULAR; INTRAVENOUS ONCE
Status: COMPLETED | OUTPATIENT
Start: 2019-09-12 | End: 2019-09-12

## 2019-09-12 RX ORDER — DIPHENHYDRAMINE HYDROCHLORIDE 50 MG/ML
12.5 INJECTION INTRAMUSCULAR; INTRAVENOUS EVERY 4 HOURS PRN
Status: ACTIVE | OUTPATIENT
Start: 2019-09-12 | End: 2019-09-13

## 2019-09-12 RX ORDER — PROCHLORPERAZINE EDISYLATE 5 MG/ML
5 INJECTION INTRAMUSCULAR; INTRAVENOUS ONCE AS NEEDED
Status: ACTIVE | OUTPATIENT
Start: 2019-09-12 | End: 2019-09-12

## 2019-09-12 RX ORDER — LIDOCAINE HYDROCHLORIDE AND EPINEPHRINE 20; 5 MG/ML; UG/ML
10 INJECTION, SOLUTION EPIDURAL; INFILTRATION; INTRACAUDAL; PERINEURAL ONCE
Status: COMPLETED | OUTPATIENT
Start: 2019-09-12 | End: 2019-09-12

## 2019-09-12 RX ORDER — HYDROMORPHONE HYDROCHLORIDE 1 MG/ML
0.6 INJECTION, SOLUTION INTRAMUSCULAR; INTRAVENOUS; SUBCUTANEOUS
Status: ACTIVE | OUTPATIENT
Start: 2019-09-12 | End: 2019-09-13

## 2019-09-12 RX ORDER — BUPIVACAINE HYDROCHLORIDE 2.5 MG/ML
INJECTION, SOLUTION EPIDURAL; INFILTRATION; INTRACAUDAL
Status: COMPLETED | OUTPATIENT
Start: 2019-09-12 | End: 2019-09-12

## 2019-09-12 RX ORDER — HYDROCODONE BITARTRATE AND ACETAMINOPHEN 7.5; 325 MG/1; MG/1
1 TABLET ORAL EVERY 4 HOURS PRN
Status: DISCONTINUED | OUTPATIENT
Start: 2019-09-13 | End: 2019-09-16

## 2019-09-12 RX ORDER — SODIUM CHLORIDE, SODIUM LACTATE, POTASSIUM CHLORIDE, CALCIUM CHLORIDE 600; 310; 30; 20 MG/100ML; MG/100ML; MG/100ML; MG/100ML
INJECTION, SOLUTION INTRAVENOUS CONTINUOUS
Status: DISCONTINUED | OUTPATIENT
Start: 2019-09-12 | End: 2019-09-12 | Stop reason: HOSPADM

## 2019-09-12 RX ORDER — SODIUM CHLORIDE 0.9 % (FLUSH) 0.9 %
10 SYRINGE (ML) INJECTION AS NEEDED
Status: DISCONTINUED | OUTPATIENT
Start: 2019-09-12 | End: 2019-09-12 | Stop reason: HOSPADM

## 2019-09-12 RX ORDER — AMMONIA INHALANTS 0.04 G/.3ML
0.3 INHALANT RESPIRATORY (INHALATION) AS NEEDED
Status: DISCONTINUED | OUTPATIENT
Start: 2019-09-12 | End: 2019-09-16

## 2019-09-12 RX ORDER — HYDROCODONE BITARTRATE AND ACETAMINOPHEN 7.5; 325 MG/1; MG/1
1 TABLET ORAL EVERY 6 HOURS PRN
Status: DISPENSED | OUTPATIENT
Start: 2019-09-12 | End: 2019-09-13

## 2019-09-12 RX ORDER — NALOXONE HYDROCHLORIDE 0.4 MG/ML
0.08 INJECTION, SOLUTION INTRAMUSCULAR; INTRAVENOUS; SUBCUTANEOUS
Status: ACTIVE | OUTPATIENT
Start: 2019-09-12 | End: 2019-09-13

## 2019-09-12 RX ORDER — HYDROCODONE BITARTRATE AND ACETAMINOPHEN 7.5; 325 MG/1; MG/1
2 TABLET ORAL EVERY 6 HOURS PRN
Status: ACTIVE | OUTPATIENT
Start: 2019-09-12 | End: 2019-09-13

## 2019-09-12 RX ORDER — ONDANSETRON 2 MG/ML
4 INJECTION INTRAMUSCULAR; INTRAVENOUS ONCE AS NEEDED
Status: ACTIVE | OUTPATIENT
Start: 2019-09-12 | End: 2019-09-12

## 2019-09-12 RX ORDER — SIMETHICONE 80 MG
80 TABLET,CHEWABLE ORAL 3 TIMES DAILY PRN
Status: DISCONTINUED | OUTPATIENT
Start: 2019-09-12 | End: 2019-09-16

## 2019-09-12 RX ORDER — MORPHINE SULFATE 1 MG/ML
INJECTION, SOLUTION EPIDURAL; INTRATHECAL; INTRAVENOUS AS NEEDED
Status: DISCONTINUED | OUTPATIENT
Start: 2019-09-12 | End: 2019-09-12 | Stop reason: SURG

## 2019-09-12 RX ORDER — NALBUPHINE HCL 10 MG/ML
2.5 AMPUL (ML) INJECTION EVERY 4 HOURS PRN
Status: ACTIVE | OUTPATIENT
Start: 2019-09-12 | End: 2019-09-13

## 2019-09-12 RX ORDER — DEXTROSE, SODIUM CHLORIDE, SODIUM LACTATE, POTASSIUM CHLORIDE, AND CALCIUM CHLORIDE 5; .6; .31; .03; .02 G/100ML; G/100ML; G/100ML; G/100ML; G/100ML
INJECTION, SOLUTION INTRAVENOUS CONTINUOUS
Status: DISCONTINUED | OUTPATIENT
Start: 2019-09-12 | End: 2019-09-12 | Stop reason: HOSPADM

## 2019-09-12 RX ORDER — DOCUSATE SODIUM 100 MG/1
100 CAPSULE, LIQUID FILLED ORAL DAILY
Status: DISCONTINUED | OUTPATIENT
Start: 2019-09-12 | End: 2019-09-16

## 2019-09-12 RX ORDER — CHOLECALCIFEROL (VITAMIN D3) 25 MCG
1 TABLET,CHEWABLE ORAL DAILY
Status: DISCONTINUED | OUTPATIENT
Start: 2019-09-12 | End: 2019-09-16

## 2019-09-12 RX ORDER — BUPIVACAINE HYDROCHLORIDE 2.5 MG/ML
INJECTION, SOLUTION EPIDURAL; INFILTRATION; INTRACAUDAL
Status: DISCONTINUED
Start: 2019-09-12 | End: 2019-09-12

## 2019-09-12 RX ORDER — LIDOCAINE HYDROCHLORIDE 10 MG/ML
30 INJECTION, SOLUTION EPIDURAL; INFILTRATION; INTRACAUDAL; PERINEURAL ONCE
Status: DISCONTINUED | OUTPATIENT
Start: 2019-09-12 | End: 2019-09-12 | Stop reason: HOSPADM

## 2019-09-12 RX ORDER — BUPIVACAINE HYDROCHLORIDE 2.5 MG/ML
10 INJECTION, SOLUTION EPIDURAL; INFILTRATION; INTRACAUDAL ONCE
Status: DISCONTINUED | OUTPATIENT
Start: 2019-09-12 | End: 2019-09-12

## 2019-09-12 RX ORDER — NALBUPHINE HCL 10 MG/ML
2.5 AMPUL (ML) INJECTION
Status: DISCONTINUED | OUTPATIENT
Start: 2019-09-12 | End: 2019-09-12

## 2019-09-12 RX ORDER — HYDROMORPHONE HYDROCHLORIDE 1 MG/ML
0.4 INJECTION, SOLUTION INTRAMUSCULAR; INTRAVENOUS; SUBCUTANEOUS
Status: DISPENSED | OUTPATIENT
Start: 2019-09-12 | End: 2019-09-13

## 2019-09-12 RX ORDER — ENOXAPARIN SODIUM 100 MG/ML
30 INJECTION SUBCUTANEOUS EVERY 12 HOURS
Status: DISCONTINUED | OUTPATIENT
Start: 2019-09-13 | End: 2019-09-16

## 2019-09-12 RX ADMIN — LIDOCAINE HYDROCHLORIDE AND EPINEPHRINE 3 ML: 20; 5 INJECTION, SOLUTION EPIDURAL; INFILTRATION; INTRACAUDAL; PERINEURAL at 16:23:00

## 2019-09-12 RX ADMIN — LIDOCAINE HYDROCHLORIDE AND EPINEPHRINE 3 ML: 15; 5 INJECTION, SOLUTION EPIDURAL at 06:03:00

## 2019-09-12 RX ADMIN — SODIUM CHLORIDE, SODIUM LACTATE, POTASSIUM CHLORIDE, CALCIUM CHLORIDE: 600; 310; 30; 20 INJECTION, SOLUTION INTRAVENOUS at 17:13:00

## 2019-09-12 RX ADMIN — LIDOCAINE HYDROCHLORIDE 5 ML: 10 INJECTION, SOLUTION INFILTRATION; PERINEURAL at 06:03:00

## 2019-09-12 RX ADMIN — SODIUM CHLORIDE, SODIUM LACTATE, POTASSIUM CHLORIDE, CALCIUM CHLORIDE: 600; 310; 30; 20 INJECTION, SOLUTION INTRAVENOUS at 15:33:00

## 2019-09-12 RX ADMIN — MIDAZOLAM HYDROCHLORIDE 2 MG: 1 INJECTION INTRAMUSCULAR; INTRAVENOUS at 16:47:00

## 2019-09-12 RX ADMIN — LIDOCAINE HYDROCHLORIDE AND EPINEPHRINE 3 ML: 20; 5 INJECTION, SOLUTION EPIDURAL; INFILTRATION; INTRACAUDAL; PERINEURAL at 16:31:00

## 2019-09-12 RX ADMIN — MORPHINE SULFATE 3 MG: 1 INJECTION, SOLUTION EPIDURAL; INTRATHECAL; INTRAVENOUS at 16:31:00

## 2019-09-12 RX ADMIN — LIDOCAINE HYDROCHLORIDE AND EPINEPHRINE 3 ML: 20; 5 INJECTION, SOLUTION EPIDURAL; INFILTRATION; INTRACAUDAL; PERINEURAL at 16:29:00

## 2019-09-12 RX ADMIN — LIDOCAINE HYDROCHLORIDE AND EPINEPHRINE 3 ML: 20; 5 INJECTION, SOLUTION EPIDURAL; INFILTRATION; INTRACAUDAL; PERINEURAL at 16:27:00

## 2019-09-12 RX ADMIN — BUPIVACAINE HYDROCHLORIDE 10 ML: 2.5 INJECTION, SOLUTION EPIDURAL; INFILTRATION; INTRACAUDAL at 06:03:00

## 2019-09-12 RX ADMIN — LIDOCAINE HYDROCHLORIDE AND EPINEPHRINE 3 ML: 20; 5 INJECTION, SOLUTION EPIDURAL; INFILTRATION; INTRACAUDAL; PERINEURAL at 16:25:00

## 2019-09-12 NOTE — ANESTHESIA POSTPROCEDURE EVALUATION
Patient: Ashli Andres    Procedure Summary     Date:  19 Room / Location:  [facility] L+D OR    Anesthesia Start:   Anesthesia Stop:      Procedure:   SECTION (N/A ) Diagnosis:  (Arrest of Descent)    Surgeon:  DO Angie Chou

## 2019-09-12 NOTE — ANESTHESIA PROCEDURE NOTES
Labor Analgesia  Date/Time: 9/12/2019 6:03 AM  Performed by: Lupe Salamanca MD  Authorized by: Lupe Salamanca MD     Patient Location:  OB  Start Time:  9/12/2019 5:41 AM  Reason for Block: labor epidural    Anesthesiologist:  Lupe Salamanca MD  P

## 2019-09-12 NOTE — PROGRESS NOTES
Pt is a 36year old female admitted to TR3/TR3-A. Patient presents with:  R/o Labor     Pt is  39w5d intra-uterine pregnancy. History obtained, consents signed. Oriented to room, staff, and plan of care.

## 2019-09-12 NOTE — PROGRESS NOTES
[facility] - [facility]     Section Delivery / Brief Operative Note    Catarina Diamond Patient Status:  Inpatient    1985 MRN [id number]   Location [address] [place] [address] Attending Estephania Juarez MD   Hosp Day # 0 PCP No

## 2019-09-12 NOTE — ANESTHESIA PREPROCEDURE EVALUATION
Anesthesia PreOp Note    HPI:     Carlos Arreaga is a 36year old female who presents for preoperative consultation requested by: * No surgeons listed *    Date of Surgery: 9/12/2019    * No procedures listed *  Indication: * No pre-op diagnosis entered MD     Ammonia Aromatic (ammonia) nasal solution 0.3 mL 0.3 mL Nasal PRN Lucero Manjarrez MD     fentaNYL citrate (SUBLIMAZE) 0.05 MG/ML injection 50 mcg 50 mcg Intravenous Q30 Min PRN Lucero Manjarrez MD     lactated ringers IV bolus 1,000 mL 1,000 mL Int drinks        Comment: Before pregnancy would have a drink once a year.        Drug use: No      Sexual activity: Yes        Partners: Male    Lifestyle      Physical activity:        Days per week: Not on file        Minutes per session: Not on file      S 09/12/19  0611 09/12/19  0613   BP: 141/71  126/75 138/63   Pulse: 64 61 72 54   Resp:       Temp:       TempSrc:       SpO2:  99%     Weight:       Height:            Anesthesia Evaluation     Patient summary reviewed and Nursing notes reviewed    No hist

## 2019-09-12 NOTE — DISCHARGE SUMMARY
[facility]    Discharge Summary    Farrah Blanco Patient Status:  Inpatient    1985 MRN [id number]   Location [address] Attending Layla Eng MD   Hosp Day # 0       Admit date:  2019    Dis

## 2019-09-12 NOTE — H&P
Yasemin 132 Patient Status:  Observation    1985 MRN [id number]   Location [address] [facility] Attending Zeeshan Navarrete MD   Hosp Day # 0 PCP No primary care provider on fi distress  Psych:  Alert and oriented  Skin:  Warm and hydrated, No rashes  CV:  No edema  Respiratory:  No labored breathing  Abdomen: gravid nontender  : Vaginal exam:  Dilation: 2 cm    Effacement: 80 %    Station: -2    Position: Cephalic    *ferning

## 2019-09-12 NOTE — ANESTHESIA PROCEDURE NOTES
Peripheral IV  Date/Time: 9/12/2019 4:55 PM  Inserted by: Jamin Newman MD    Placement  Needle size: 18 G  Laterality: left  Location: wrist  Local anesthetic: none  Site prep: alcohol  Technique: anatomical landmarks  Attempts: 1

## 2019-09-13 LAB
BASOPHILS # BLD AUTO: 0.04 X10(3) UL (ref 0–0.2)
BASOPHILS NFR BLD AUTO: 0.2 %
DEPRECATED RDW RBC AUTO: 48.9 FL (ref 35.1–46.3)
EOSINOPHIL # BLD AUTO: 0.02 X10(3) UL (ref 0–0.7)
EOSINOPHIL NFR BLD AUTO: 0.1 %
ERYTHROCYTE [DISTWIDTH] IN BLOOD BY AUTOMATED COUNT: 17.2 % (ref 11–15)
HCT VFR BLD AUTO: 30.2 % (ref 35–48)
HGB BLD-MCNC: 9.2 G/DL (ref 12–16)
IMM GRANULOCYTES # BLD AUTO: 0.09 X10(3) UL (ref 0–1)
IMM GRANULOCYTES NFR BLD: 0.5 %
LYMPHOCYTES # BLD AUTO: 2.06 X10(3) UL (ref 1–4)
LYMPHOCYTES NFR BLD AUTO: 11.1 %
MCH RBC QN AUTO: 24 PG (ref 26–34)
MCHC RBC AUTO-ENTMCNC: 30.5 G/DL (ref 31–37)
MCV RBC AUTO: 78.9 FL (ref 80–100)
MONOCYTES # BLD AUTO: 1.28 X10(3) UL (ref 0.1–1)
MONOCYTES NFR BLD AUTO: 6.9 %
NEUTROPHILS # BLD AUTO: 15.03 X10 (3) UL (ref 1.5–7.7)
NEUTROPHILS # BLD AUTO: 15.03 X10(3) UL (ref 1.5–7.7)
NEUTROPHILS NFR BLD AUTO: 81.2 %
PLATELET # BLD AUTO: 186 10(3)UL (ref 150–450)
RBC # BLD AUTO: 3.83 X10(6)UL (ref 3.8–5.3)
WBC # BLD AUTO: 18.5 X10(3) UL (ref 4–11)

## 2019-09-13 PROCEDURE — 59510 CESAREAN DELIVERY: CPT | Performed by: OBSTETRICS & GYNECOLOGY

## 2019-09-13 PROCEDURE — 59514 CESAREAN DELIVERY ONLY: CPT | Performed by: OBSTETRICS & GYNECOLOGY

## 2019-09-13 RX ORDER — IBUPROFEN 600 MG/1
600 TABLET ORAL EVERY 6 HOURS PRN
Status: DISCONTINUED | OUTPATIENT
Start: 2019-09-14 | End: 2019-09-14

## 2019-09-13 NOTE — LACTATION NOTE
LACTATION NOTE - MOTHER      Evaluation Type: Inpatient    Problems identified  Problems identified: Knowledge deficit    Maternal history  Maternal history: Obesity  Other/comment: anemia    Breastfeeding goal  Breastfeeding goal: To maintain breast milk

## 2019-09-13 NOTE — PROGRESS NOTES
Pt received into room 356. Pt transferred via cart and transferred to bed with assist from staff. Report received from [facility]. Assessment and vitals WNL. Pt oriented to room, bed in lowest position, locked, siderails up x2, call light within reach.  POC

## 2019-09-13 NOTE — PLAN OF CARE
Problem: GASTROINTESTINAL - ADULT  Goal: Minimal or absence of nausea and vomiting  Description  INTERVENTIONS:  - Maintain adequate hydration with IV or PO as ordered and tolerated  - Nasogastric tube to low intermittent suction as ordered  - Evaluate e smacking, sucking fingers/hand) versus late cue of crying.  - Discuss/demonstrate breast feeding aids (e.g., infant sling, nursing footstool/pillows, and breast pumps).   - Encourage mother/other family members to express feelings/concerns, and actively [address]

## 2019-09-13 NOTE — PROGRESS NOTES
[place] [facility] - [facility]    OB/GYNE Progress Note      Kirstenromario Means Patient Status:  Inpatient    1985 MRN [id number]   Location [facility] 3SE Attending Cristhian Galvan MD   Hosp Day # 1 PCP No primary care provider on file.        Sub

## 2019-09-13 NOTE — ANESTHESIA POSTPROCEDURE EVALUATION
Patient: Joseluis Lara    Procedure Summary     Date:  19 Room / Location:  [address] [place] [address] L+D OR  [facility] L+D OR    Anesthesia Start:  4877 Anesthesia Stop:  8592    Procedure:   SECTION (N/A Abdomen) Diagnosis:  (same as pre-op)    Surgeon:  Hilary Norman

## 2019-09-13 NOTE — PROGRESS NOTES
Patient transferred to mother/baby room 356 per cart in stable condition with baby and personal belongings. Accompanied by  and staff.   Report given to mother/baby RN Nitza Orourke

## 2019-09-13 NOTE — LACTATION NOTE
LACTATION NOTE - MOTHER      Evaluation Type: Inpatient    Problems identified  Problems identified: Knowledge deficit; Unable to acheive sustained latch    Maternal history  Maternal history: Obesity; Anemia  Other/comment: anemia    Breastfeeding goal  Zayda

## 2019-09-14 RX ORDER — MELATONIN
325
Status: DISCONTINUED | OUTPATIENT
Start: 2019-09-14 | End: 2019-09-16

## 2019-09-14 RX ORDER — IBUPROFEN 600 MG/1
600 TABLET ORAL EVERY 6 HOURS PRN
Status: DISCONTINUED | OUTPATIENT
Start: 2019-09-14 | End: 2019-09-16

## 2019-09-14 NOTE — LACTATION NOTE
LACTATION NOTE - MOTHER      Evaluation Type: Inpatient    Problems identified  Problems identified: Knowledge deficit    Maternal history  Maternal history: Obesity; Anemia    Breastfeeding goal  Breastfeeding goal: To maintain breast milk feeding per fidelia

## 2019-09-14 NOTE — PROGRESS NOTES
[facility]    OB/Gyne Post  Section Progress Note      Ashli Andres Patient Status:  Inpatient    1985 MRN [id number]   Location [facility] 3SE Attending Tyler Ramos MD   Hosp Day # 2 PCP No primary care frankiei

## 2019-09-15 PROBLEM — Z34.90 PREGNANCY (HCC): Status: RESOLVED | Noted: 2019-09-12 | Resolved: 2019-09-15

## 2019-09-15 PROBLEM — Z34.90 PREGNANCY: Status: RESOLVED | Noted: 2019-09-12 | Resolved: 2019-09-15

## 2019-09-15 RX ORDER — HYDROCODONE BITARTRATE AND ACETAMINOPHEN 7.5; 325 MG/1; MG/1
1 TABLET ORAL EVERY 6 HOURS PRN
Qty: 30 TABLET | Refills: 0 | Status: SHIPPED | OUTPATIENT
Start: 2019-09-15 | End: 2019-10-25

## 2019-09-15 RX ORDER — IBUPROFEN 600 MG/1
600 TABLET ORAL EVERY 6 HOURS PRN
Qty: 30 TABLET | Refills: 0 | Status: SHIPPED | OUTPATIENT
Start: 2019-09-15 | End: 2021-09-29

## 2019-09-15 NOTE — LACTATION NOTE
This note was copied from a baby's chart. LACTATION NOTE - INFANT    Evaluation Type  Evaluation Type: Inpatient    Problems & Assessment  Problems Diagnosed or Identified: Jaundice; Excessive weight loss  Problems: comment/detail: Weight loss over 8% and

## 2019-09-15 NOTE — PROGRESS NOTES
[facility] HOSP - [facility]    Post  Progress Note      Yeni Carter Patient Status:  Inpatient    1985 MRN [id number]   Location [facility] 3SE Attending Ann Manriquez MD   Hosp Day # 3 PCP No primary care provider on file.

## 2019-09-15 NOTE — LACTATION NOTE
LACTATION NOTE - MOTHER      Evaluation Type: Inpatient    Problems identified  Problems identified: Knowledge deficit;Milk supply not WNL  Milk supply not WNL: Reduced (potential)  Problems Identified Other: Infant weight loss greater than 8% and elevated

## 2019-09-16 VITALS
WEIGHT: 278 LBS | TEMPERATURE: 98 F | HEIGHT: 68 IN | OXYGEN SATURATION: 100 % | BODY MASS INDEX: 42.13 KG/M2 | HEART RATE: 60 BPM | RESPIRATION RATE: 16 BRPM | DIASTOLIC BLOOD PRESSURE: 77 MMHG | SYSTOLIC BLOOD PRESSURE: 137 MMHG

## 2019-09-16 NOTE — PROGRESS NOTES
[facility]    Post  Progress Note      Derrick Boucher Patient Status:  Inpatient    1985 MRN [id number]   Location [facility] 3SE Attending Saul Curiel MD   Hosp Day # 4 PCP No primary care provider on file.

## 2019-09-16 NOTE — PLAN OF CARE
Problem: BIRTH - VAGINAL/ SECTION  Goal: Fetal and maternal status remain reassuring during the birth process  Description  INTERVENTIONS:  - Monitor vital signs  - Monitor fetal heart rate  - Monitor uterine activity  - Monitor labor progression function  Description  INTERVENTIONS:  - Assess bowel function  - Maintain adequate hydration with IV or PO as ordered and tolerated  - Evaluate effectiveness of GI medications  - Encourage mobilization and activity  - Obtain nutritional consult as needed protective equipment as indicated. Ensure aseptic care of all intravenous lines and invasive tubes/drains.  - Obtain immunization and exposure to communicable diseases history.   Outcome: Completed  Goal: Optimize infant feeding at the breast  Description assistance until it is safe to breastfeed infant. Outcome: Completed  Goal: Appropriate maternal -  bonding  Description  INTERVENTIONS:  - Assess caregiver- interactions. - Assess caregiver's emotional status and coping mechanisms.   Emily Haddad

## 2019-09-17 NOTE — OPERATIVE REPORT
[facility]    PATIENT'S NAME: Delia Damian   ATTENDING PHYSICIAN: Nikky Manjarrez MD   OPERATING PHYSICIAN: Tierney Nevarez DO   PATIENT ACCOUNT#:   [de-identified]    LOCATION:  3SE [address] [facility] #:   [id number]       DATE OF BIRTH: approximately 1520. I was tending to another patient during that time period and actually re-examined the patient at approximately 1600. She had no further descent and further increased caput.   I had discussed primary  section with the patient wi vigorous and  recommendations were to delay cord clamping for 30 seconds. Once this time period was up, we doubly clamped and cut the cord and the infant was transferred to the  staff.   Cord blood sample was obtained and the placenta was t

## 2019-09-18 ENCOUNTER — TELEPHONE (OUTPATIENT)
Dept: OBGYN CLINIC | Facility: CLINIC | Age: 34
End: 2019-09-18

## 2019-09-18 NOTE — TELEPHONE ENCOUNTER
CASEY CALLING FROM [facility] / SHORT TERM DISABILITY / REQUESTING THE TYPE OF DELIVERY / DATE OF DELIVERY / CLAIM # [id number] / PLS ADV

## 2019-09-18 NOTE — TELEPHONE ENCOUNTER
Received letter from [facility] regarding  Disability, sent to [facility] department. Copy to scanning.

## 2019-09-19 ENCOUNTER — TELEPHONE (OUTPATIENT)
Dept: LACTATION | Facility: HOSPITAL | Age: 34
End: 2019-09-19

## 2019-09-19 NOTE — TELEPHONE ENCOUNTER
Mom states she called and left a message a few days ago, she would like an outpatient visit scheduled, she is having difficulty latching baby to right breast and has some engorgement on that side.  Discussed pumping right breast if infant does not latch unt

## 2019-09-23 ENCOUNTER — NURSE ONLY (OUTPATIENT)
Dept: LACTATION | Facility: HOSPITAL | Age: 34
End: 2019-09-23
Attending: OBSTETRICS & GYNECOLOGY
Payer: COMMERCIAL

## 2019-09-23 PROCEDURE — 99213 OFFICE O/P EST LOW 20 MIN: CPT

## 2019-09-23 NOTE — PROGRESS NOTES
LACTATION NOTE - MOTHER      Evaluation Type: Outpatient Initial    Problems identified  Problems identified: Knowledge deficit;Milk supply not WNL  Milk supply not WNL: Reduced (potential)    Maternal history  Maternal history:  section;Obesity; An w/mother's permission;Calm and ready to breastfeed;Deep latch achieved and observed  Breastfeeding Positions: cross cradle;right breast(0910 until 0925)  Latch: Grasps breast, tongue down, lips flanged, rhythmic sucking  Audible Sucks/Swallows: Spontaneous recommended Kasey rent a hospital grade breastpump to obtain 8-12 times of effective stimulation per 24 hours. She started drinking Mother's milk tea on 9-17, but this is not a consistent galactogogue.  I recommended she try the Mother's Milk Tea tincture

## 2019-09-23 NOTE — TELEPHONE ENCOUNTER
[facility] FAXED OVER FORM NEEDING MORE INFO FOR DISABILITY CLAIM.  SCANNED/EMAILED FORMS ON 9/23/29

## 2019-09-23 NOTE — PATIENT INSTRUCTIONS
Infant Discharge Feeding Plan -      I highly recommend Mila be further evaluated by a skilled speech or physical therapist or body work provider. Resource list provided.  If muscle restrictions and cranial asymmetry are untreated it is more dif ? Compressing the breast when your baby sucks can increase milk flow. ? At least 6-8 wet diapers and at least 3-4 soft, yellow seedy stools every 24 hours. Use the breastfeeding journal to keep a record. ?  Weight gain of at least 5-7 ounces per week If your baby is mostly breastfeeding a weight check 1-2 times per week may be needed.  Call your infant’s healthcare provider sooner if your baby is not meeting the guidelines for feedings and diapers in your breastfeeding journal, if skin color or the whit

## 2019-10-03 ENCOUNTER — IMMUNIZATION (OUTPATIENT)
Dept: PEDIATRICS CLINIC | Facility: CLINIC | Age: 34
End: 2019-10-03
Payer: COMMERCIAL

## 2019-10-03 DIAGNOSIS — Z23 NEED FOR VACCINATION: ICD-10-CM

## 2019-10-03 PROCEDURE — 90686 IIV4 VACC NO PRSV 0.5 ML IM: CPT | Performed by: PEDIATRICS

## 2019-10-03 PROCEDURE — 90471 IMMUNIZATION ADMIN: CPT | Performed by: PEDIATRICS

## 2019-10-25 ENCOUNTER — POSTPARTUM (OUTPATIENT)
Dept: OBGYN CLINIC | Facility: CLINIC | Age: 34
End: 2019-10-25
Payer: COMMERCIAL

## 2019-10-25 VITALS
SYSTOLIC BLOOD PRESSURE: 120 MMHG | WEIGHT: 249.63 LBS | HEART RATE: 71 BPM | DIASTOLIC BLOOD PRESSURE: 80 MMHG | BODY MASS INDEX: 38 KG/M2

## 2019-10-25 RX ORDER — ETONOGESTREL AND ETHINYL ESTRADIOL 11.7; 2.7 MG/1; MG/1
1 INSERT, EXTENDED RELEASE VAGINAL
Qty: 3 EACH | Refills: 3 | Status: SHIPPED | OUTPATIENT
Start: 2019-10-25 | End: 2020-09-29

## 2019-10-30 PROBLEM — O99.013 ANEMIA DURING PREGNANCY IN THIRD TRIMESTER (HCC): Status: RESOLVED | Noted: 2019-07-08 | Resolved: 2019-10-30

## 2019-10-30 PROBLEM — O99.013 ANEMIA DURING PREGNANCY IN THIRD TRIMESTER: Status: RESOLVED | Noted: 2019-07-08 | Resolved: 2019-10-30

## 2019-10-31 NOTE — PROGRESS NOTES
ASIM    Carola Middleton is a 36year old female  here for 6 week post-partum visit. Patient delivered a  female infant on 19 [address] ). Patient desires Clementeen Cliche. for contraception. Patient is bottle feeding for past 2 weeks.     Patient d Paragard IUD, nuvaring, orthoevra patch, nexplanon, Depoprovera, condoms or tubal sterilization options. Patient has chosen Nuvaring. Patient to return for annual gyne exam in September.

## 2019-11-20 ENCOUNTER — OFFICE VISIT (OUTPATIENT)
Dept: FAMILY MEDICINE CLINIC | Facility: CLINIC | Age: 34
End: 2019-11-20
Payer: COMMERCIAL

## 2019-11-20 VITALS
DIASTOLIC BLOOD PRESSURE: 77 MMHG | HEART RATE: 66 BPM | BODY MASS INDEX: 37.59 KG/M2 | RESPIRATION RATE: 16 BRPM | HEIGHT: 68 IN | WEIGHT: 248 LBS | SYSTOLIC BLOOD PRESSURE: 131 MMHG | TEMPERATURE: 99 F

## 2019-11-20 DIAGNOSIS — M65.4 DE QUERVAIN'S TENOSYNOVITIS, BILATERAL: Primary | ICD-10-CM

## 2019-11-20 PROCEDURE — 99203 OFFICE O/P NEW LOW 30 MIN: CPT | Performed by: FAMILY MEDICINE

## 2019-11-20 RX ORDER — IBUPROFEN 600 MG/1
600 TABLET ORAL EVERY 8 HOURS
Qty: 30 TABLET | Refills: 0 | Status: SHIPPED | OUTPATIENT
Start: 2019-11-20 | End: 2019-11-27

## 2019-11-20 NOTE — PROGRESS NOTES
HPI: Becky Clay is a 36year old female who presents for pain in bilateral thumbs. Has not had a doctor in some time. Had  about 10 weeks.  Pt reports her ight htumb started to hurt on the day she was discharged from hospital.  2 weeks later, her left plan.  Educational material given.     Mary Grace Orozco, DO

## 2019-11-20 NOTE — PATIENT INSTRUCTIONS
Jimbo Dao Tenosynovitis    De Quervain tenosynovitis is inflammation of tendons and synovium on the thumb side of the wrist. Tendons are fibers that attach muscle to bone. Synovium is a slick membrane that helps tendons move.  Movements done over and ov Follow up with your healthcare provider, or as advised. You may need more treatment if your injury is severe or if your symptoms don't get better. This additional treatment may include local injections, physical therapy, and surgery.   When to seek medical If other kinds of treatment don’t ease your pain, or if the pain is bad, you may need surgery. The sheath that surrounds the tendons is released so the tendons can move more easily. This helps reduce the inflammation.  It also allows you to straighten your You may have pain, soreness, redness, and swelling along the side of your wrist and the base of your thumb.  You may feel pain when you pinch or grasp things, turn or touch your wrist, or make a fist. Your thumb may catch or make a crackling sound when you © 1183-9889 [employer] [address]. [address] [facility], [facility] Jansen Kunia. All rights reserved. This information is not intended as a substitute for professional medical care. Always follow your healthcare professional's instructions.

## 2019-12-02 ENCOUNTER — TELEPHONE (OUTPATIENT)
Dept: OBGYN CLINIC | Facility: CLINIC | Age: 34
End: 2019-12-02

## 2019-12-02 NOTE — TELEPHONE ENCOUNTER
Msg left on VM that letter has been faxed per her request.  If pt has any further questions or concerns she is to call the office.

## 2019-12-16 ENCOUNTER — OFFICE VISIT (OUTPATIENT)
Dept: SURGERY | Facility: CLINIC | Age: 34
End: 2019-12-16
Payer: COMMERCIAL

## 2019-12-16 VITALS — SYSTOLIC BLOOD PRESSURE: 125 MMHG | RESPIRATION RATE: 16 BRPM | HEART RATE: 60 BPM | DIASTOLIC BLOOD PRESSURE: 80 MMHG

## 2019-12-16 DIAGNOSIS — M65.4 DE QUERVAIN'S TENOSYNOVITIS: Primary | ICD-10-CM

## 2019-12-16 PROCEDURE — L3999 UPPER LIMB ORTHOSIS NOS: HCPCS | Performed by: PLASTIC SURGERY

## 2019-12-16 PROCEDURE — 20550 NJX 1 TENDON SHEATH/LIGAMENT: CPT | Performed by: PLASTIC SURGERY

## 2019-12-16 PROCEDURE — 99243 OFF/OP CNSLTJ NEW/EST LOW 30: CPT | Performed by: PLASTIC SURGERY

## 2019-12-16 RX ORDER — BETAMETHASONE SODIUM PHOSPHATE AND BETAMETHASONE ACETATE 3; 3 MG/ML; MG/ML
6 INJECTION, SUSPENSION INTRA-ARTICULAR; INTRALESIONAL; INTRAMUSCULAR; SOFT TISSUE ONCE
Status: COMPLETED | OUTPATIENT
Start: 2019-12-16 | End: 2019-12-16

## 2019-12-16 RX ADMIN — BETAMETHASONE SODIUM PHOSPHATE AND BETAMETHASONE ACETATE 6 MG: 3; 3 INJECTION, SUSPENSION INTRA-ARTICULAR; INTRALESIONAL; INTRAMUSCULAR; SOFT TISSUE at 16:18:00

## 2019-12-16 NOTE — H&P
Rico Sams is a 36year old female that presents with Patient presents with:  Dequervain's: AIDAN thumb  .     REFERRED BY:  Tejal Hayes      Pacemaker: No  Latex Allergy: no  Coumadin: No  Plavix: No  Other anticoagulants: No  Cardiac stents: No section. Pt reclined in exam chair and armboard placed into position for injection(s). Pt tolerated procedure well and band-aid(s) applied. Pt exam chair returned to sitting position and pt left office in satisfactory condition.   Pt will call in one mon file    Relationships      Social connections:        Talks on phone: Not on file        Gets together: Not on file        Attends Hinduism service: Not on file        Active member of club or organization: Not on file        Attends meetings of clubs or Normal, No adenopathy  RESPIRATORY: Inspection - Normal, Effort - Normal  CARDIOVASCULAR: Regular rhythm, No murmurs  ABDOMEN: Inspection - Normal, No abdominal tenderness  NEURO: Memory intact  PSYCH: Oriented to person, place, time, and situation, Approp

## 2020-09-29 RX ORDER — ETONOGESTREL/ETHINYL ESTRADIOL .12-.015MG
RING, VAGINAL VAGINAL
Qty: 1 RING | Refills: 0 | Status: SHIPPED | OUTPATIENT
Start: 2020-09-29 | End: 2020-10-05

## 2020-10-05 ENCOUNTER — OFFICE VISIT (OUTPATIENT)
Dept: OBGYN CLINIC | Facility: CLINIC | Age: 35
End: 2020-10-05
Payer: COMMERCIAL

## 2020-10-05 VITALS
BODY MASS INDEX: 37 KG/M2 | HEART RATE: 73 BPM | WEIGHT: 246 LBS | SYSTOLIC BLOOD PRESSURE: 127 MMHG | DIASTOLIC BLOOD PRESSURE: 86 MMHG

## 2020-10-05 DIAGNOSIS — Z01.419 WELL WOMAN EXAM WITH ROUTINE GYNECOLOGICAL EXAM: Primary | ICD-10-CM

## 2020-10-05 DIAGNOSIS — Z76.0 MEDICATION REFILL: ICD-10-CM

## 2020-10-05 DIAGNOSIS — R68.89 TEMPERATURE INTOLERANCE: ICD-10-CM

## 2020-10-05 PROCEDURE — 99395 PREV VISIT EST AGE 18-39: CPT | Performed by: CLINICAL NURSE SPECIALIST

## 2020-10-05 PROCEDURE — 3074F SYST BP LT 130 MM HG: CPT | Performed by: CLINICAL NURSE SPECIALIST

## 2020-10-05 PROCEDURE — 3079F DIAST BP 80-89 MM HG: CPT | Performed by: CLINICAL NURSE SPECIALIST

## 2020-10-05 RX ORDER — ETONOGESTREL AND ETHINYL ESTRADIOL 11.7; 2.7 MG/1; MG/1
1 INSERT, EXTENDED RELEASE VAGINAL
Qty: 3 RING | Refills: 4 | Status: SHIPPED | OUTPATIENT
Start: 2020-10-05 | End: 2021-12-13

## 2021-04-11 ENCOUNTER — IMMUNIZATION (OUTPATIENT)
Dept: LAB | Age: 36
End: 2021-04-11
Attending: HOSPITALIST
Payer: COMMERCIAL

## 2021-04-11 DIAGNOSIS — Z23 NEED FOR VACCINATION: Primary | ICD-10-CM

## 2021-04-11 PROCEDURE — 0001A SARSCOV2 VAC 30MCG/0.3ML IM: CPT

## 2021-05-02 ENCOUNTER — IMMUNIZATION (OUTPATIENT)
Dept: LAB | Age: 36
End: 2021-05-02
Attending: HOSPITALIST
Payer: COMMERCIAL

## 2021-05-02 DIAGNOSIS — Z23 NEED FOR VACCINATION: Primary | ICD-10-CM

## 2021-05-02 PROCEDURE — 0002A SARSCOV2 VAC 30MCG/0.3ML IM: CPT

## 2021-09-29 ENCOUNTER — LAB ENCOUNTER (OUTPATIENT)
Dept: LAB | Age: 36
End: 2021-09-29
Attending: FAMILY MEDICINE
Payer: COMMERCIAL

## 2021-09-29 ENCOUNTER — OFFICE VISIT (OUTPATIENT)
Dept: FAMILY MEDICINE CLINIC | Facility: CLINIC | Age: 36
End: 2021-09-29
Payer: COMMERCIAL

## 2021-09-29 VITALS
HEART RATE: 84 BPM | DIASTOLIC BLOOD PRESSURE: 78 MMHG | RESPIRATION RATE: 16 BRPM | SYSTOLIC BLOOD PRESSURE: 125 MMHG | TEMPERATURE: 98 F | HEIGHT: 67.8 IN | WEIGHT: 263 LBS | BODY MASS INDEX: 40.32 KG/M2

## 2021-09-29 DIAGNOSIS — Z00.00 ROUTINE PHYSICAL EXAMINATION: ICD-10-CM

## 2021-09-29 DIAGNOSIS — E66.01 CLASS 3 SEVERE OBESITY DUE TO EXCESS CALORIES WITHOUT SERIOUS COMORBIDITY WITH BODY MASS INDEX (BMI) OF 40.0 TO 44.9 IN ADULT (HCC): ICD-10-CM

## 2021-09-29 DIAGNOSIS — M72.2 PLANTAR FASCIITIS OF RIGHT FOOT: ICD-10-CM

## 2021-09-29 DIAGNOSIS — Z00.00 ROUTINE PHYSICAL EXAMINATION: Primary | ICD-10-CM

## 2021-09-29 PROCEDURE — 84443 ASSAY THYROID STIM HORMONE: CPT

## 2021-09-29 PROCEDURE — 36415 COLL VENOUS BLD VENIPUNCTURE: CPT

## 2021-09-29 PROCEDURE — 3078F DIAST BP <80 MM HG: CPT | Performed by: FAMILY MEDICINE

## 2021-09-29 PROCEDURE — 85027 COMPLETE CBC AUTOMATED: CPT

## 2021-09-29 PROCEDURE — 83036 HEMOGLOBIN GLYCOSYLATED A1C: CPT

## 2021-09-29 PROCEDURE — 90471 IMMUNIZATION ADMIN: CPT | Performed by: FAMILY MEDICINE

## 2021-09-29 PROCEDURE — 99395 PREV VISIT EST AGE 18-39: CPT | Performed by: FAMILY MEDICINE

## 2021-09-29 PROCEDURE — 80061 LIPID PANEL: CPT

## 2021-09-29 PROCEDURE — 3008F BODY MASS INDEX DOCD: CPT | Performed by: FAMILY MEDICINE

## 2021-09-29 PROCEDURE — 3074F SYST BP LT 130 MM HG: CPT | Performed by: FAMILY MEDICINE

## 2021-09-29 PROCEDURE — 90686 IIV4 VACC NO PRSV 0.5 ML IM: CPT | Performed by: FAMILY MEDICINE

## 2021-09-29 PROCEDURE — 80053 COMPREHEN METABOLIC PANEL: CPT

## 2021-09-29 RX ORDER — MULTIVITAMIN
1 TABLET ORAL DAILY
COMMUNITY

## 2021-09-29 NOTE — PROGRESS NOTES
HPI: 29 yr old female who presents for physical.   with one 2 yr old. Working from home as a . Little exercise. Does not eat healthy. Gained weight. Has pain on her right foot. Worse after she is sitting for long periods.   Worse first edema. Pedal pulses 2+ nelsy. MSK:  No abnormalities. Skin: Warm/dry/intact. No abnormal moles/lesions noted. No rashes. Psych: Orientated to person, place, and time. Behavior and affect appropriate for age.     A/P:  Routine physical examination  (sonny

## 2021-10-19 DIAGNOSIS — D72.829 LEUKOCYTOSIS, UNSPECIFIED TYPE: Primary | ICD-10-CM

## 2021-12-13 ENCOUNTER — TELEPHONE (OUTPATIENT)
Dept: OBGYN CLINIC | Facility: CLINIC | Age: 36
End: 2021-12-13

## 2021-12-13 DIAGNOSIS — Z76.0 MEDICATION REFILL: ICD-10-CM

## 2021-12-13 RX ORDER — ETONOGESTREL AND ETHINYL ESTRADIOL 11.7; 2.7 MG/1; MG/1
1 INSERT, EXTENDED RELEASE VAGINAL
Qty: 2 RING | Refills: 0 | Status: SHIPPED | OUTPATIENT
Start: 2021-12-13 | End: 2022-01-11

## 2021-12-13 NOTE — TELEPHONE ENCOUNTER
Pt informed nuvaring has been refilled to pharmacy to cover her until annual. Pt verbalized understanding.

## 2021-12-13 NOTE — TELEPHONE ENCOUNTER
Patient states her pharmacy sent a request for birth control nuva ring but it was declined. Patient has annual for 01/12/2022.

## 2022-01-10 ENCOUNTER — TELEPHONE (OUTPATIENT)
Dept: OBGYN CLINIC | Facility: CLINIC | Age: 37
End: 2022-01-10

## 2022-01-10 DIAGNOSIS — Z76.0 MEDICATION REFILL: ICD-10-CM

## 2022-01-10 NOTE — TELEPHONE ENCOUNTER
Rx for Nuvaring send 12-13-21 for 2 rings. Pt states she received one. Informed pt to check with insurance, because she should have one more to have. Last annual 10-5-20. Pap 2-14-19, normal pap and neg hpv.   Pt had an annual 1-12-22, but whole family ha

## 2022-01-11 RX ORDER — ETONOGESTREL AND ETHINYL ESTRADIOL 11.7; 2.7 MG/1; MG/1
1 INSERT, EXTENDED RELEASE VAGINAL
Qty: 3 RING | Refills: 0 | Status: SHIPPED | OUTPATIENT
Start: 2022-01-11

## 2022-02-05 ENCOUNTER — IMMUNIZATION (OUTPATIENT)
Dept: LAB | Age: 37
End: 2022-02-05
Attending: EMERGENCY MEDICINE
Payer: COMMERCIAL

## 2022-02-05 DIAGNOSIS — Z23 NEED FOR VACCINATION: Primary | ICD-10-CM

## 2022-02-05 PROCEDURE — 0054A SARSCOV2 VAC 30MCG TRS SUCR: CPT | Performed by: PHYSICIAN ASSISTANT

## 2022-03-10 ENCOUNTER — OFFICE VISIT (OUTPATIENT)
Dept: OBGYN CLINIC | Facility: CLINIC | Age: 37
End: 2022-03-10
Payer: COMMERCIAL

## 2022-03-10 ENCOUNTER — LAB ENCOUNTER (OUTPATIENT)
Dept: LAB | Facility: HOSPITAL | Age: 37
End: 2022-03-10
Attending: FAMILY MEDICINE
Payer: COMMERCIAL

## 2022-03-10 VITALS
DIASTOLIC BLOOD PRESSURE: 83 MMHG | HEART RATE: 92 BPM | BODY MASS INDEX: 40 KG/M2 | WEIGHT: 263 LBS | SYSTOLIC BLOOD PRESSURE: 136 MMHG

## 2022-03-10 DIAGNOSIS — Z76.0 MEDICATION REFILL: ICD-10-CM

## 2022-03-10 DIAGNOSIS — Z01.419 WELL WOMAN EXAM WITH ROUTINE GYNECOLOGICAL EXAM: Primary | ICD-10-CM

## 2022-03-10 DIAGNOSIS — D72.829 LEUKOCYTOSIS, UNSPECIFIED TYPE: ICD-10-CM

## 2022-03-10 LAB
BASOPHILS # BLD AUTO: 0.03 X10(3) UL (ref 0–0.2)
BASOPHILS NFR BLD AUTO: 0.2 %
DEPRECATED RDW RBC AUTO: 43.4 FL (ref 35.1–46.3)
EOSINOPHIL # BLD AUTO: 0.1 X10(3) UL (ref 0–0.7)
EOSINOPHIL NFR BLD AUTO: 0.7 %
ERYTHROCYTE [DISTWIDTH] IN BLOOD BY AUTOMATED COUNT: 14.9 % (ref 11–15)
HCT VFR BLD AUTO: 39.9 %
HGB BLD-MCNC: 12.3 G/DL
IMM GRANULOCYTES # BLD AUTO: 0.05 X10(3) UL (ref 0–1)
IMM GRANULOCYTES NFR BLD: 0.4 %
LYMPHOCYTES # BLD AUTO: 2.84 X10(3) UL (ref 1–4)
LYMPHOCYTES NFR BLD AUTO: 21.1 %
MCH RBC QN AUTO: 24.6 PG (ref 26–34)
MCHC RBC AUTO-ENTMCNC: 30.8 G/DL (ref 31–37)
MCV RBC AUTO: 79.8 FL
MONOCYTES # BLD AUTO: 0.76 X10(3) UL (ref 0.1–1)
MONOCYTES NFR BLD AUTO: 5.7 %
NEUTROPHILS # BLD AUTO: 9.65 X10 (3) UL (ref 1.5–7.7)
NEUTROPHILS # BLD AUTO: 9.65 X10(3) UL (ref 1.5–7.7)
NEUTROPHILS NFR BLD AUTO: 71.9 %
PLATELET # BLD AUTO: 349 10(3)UL (ref 150–450)
RBC # BLD AUTO: 5 X10(6)UL
WBC # BLD AUTO: 13.4 X10(3) UL (ref 4–11)

## 2022-03-10 PROCEDURE — 99395 PREV VISIT EST AGE 18-39: CPT | Performed by: CLINICAL NURSE SPECIALIST

## 2022-03-10 PROCEDURE — 3079F DIAST BP 80-89 MM HG: CPT | Performed by: CLINICAL NURSE SPECIALIST

## 2022-03-10 PROCEDURE — 85025 COMPLETE CBC W/AUTO DIFF WBC: CPT

## 2022-03-10 PROCEDURE — 3075F SYST BP GE 130 - 139MM HG: CPT | Performed by: CLINICAL NURSE SPECIALIST

## 2022-03-10 PROCEDURE — 36415 COLL VENOUS BLD VENIPUNCTURE: CPT

## 2022-03-10 RX ORDER — ETONOGESTREL AND ETHINYL ESTRADIOL 11.7; 2.7 MG/1; MG/1
1 INSERT, EXTENDED RELEASE VAGINAL
Qty: 3 RING | Refills: 4 | Status: SHIPPED | OUTPATIENT
Start: 2022-03-10

## 2022-04-27 ENCOUNTER — APPOINTMENT (OUTPATIENT)
Dept: HEMATOLOGY/ONCOLOGY | Facility: HOSPITAL | Age: 37
End: 2022-04-27
Attending: INTERNAL MEDICINE
Payer: COMMERCIAL

## 2022-04-27 VITALS
WEIGHT: 265 LBS | DIASTOLIC BLOOD PRESSURE: 86 MMHG | HEIGHT: 68 IN | RESPIRATION RATE: 16 BRPM | BODY MASS INDEX: 40.16 KG/M2 | SYSTOLIC BLOOD PRESSURE: 146 MMHG | HEART RATE: 110 BPM | OXYGEN SATURATION: 98 % | TEMPERATURE: 99 F

## 2022-04-27 DIAGNOSIS — E66.9 OBESITY, UNSPECIFIED CLASSIFICATION, UNSPECIFIED OBESITY TYPE, UNSPECIFIED WHETHER SERIOUS COMORBIDITY PRESENT: ICD-10-CM

## 2022-04-27 DIAGNOSIS — D72.9 NEUTROPHILIA: Primary | ICD-10-CM

## 2022-04-27 PROCEDURE — 99244 OFF/OP CNSLTJ NEW/EST MOD 40: CPT | Performed by: INTERNAL MEDICINE

## 2022-04-28 NOTE — CONSULTS
[facility]    PATIENT'S NAME: Mack Angel PHYSICIAN: Dominik Martinez. Crispin Cole MD   PATIENT ACCOUNT #: [de-identified] LOCATION: [address] [place] [address] RECORD #: [id number] YOB: 1985   CONSULTATION DATE: 04/27/2022       [facility] NEW PATIENT CONSULT    REQUESTING PHYSICIAN:   Reyna Reynolds DO.    REASON FOR CONSULTATION:  Leukocytosis/neutrophilia. HISTORY OF PRESENT ILLNESS:  The patient is a pleasant 28-year-old female being evaluated by Hematology for leukocytosis/neutrophilia. She had labs through her primary care physician on 03/10/2022, where her total white blood cell count was 13,400, hemoglobin 12.3, platelets 280,849. Her absolute neutrophil count was 9650, lymphocyte count 2840, monocyte count 760, eosinophil count 100. The patient denies any fevers, chills, night sweats, or unintentional weight loss. She denies any chest pain or cough. She denies any dysuria. Going back to 09/13/2029, her total white blood cell count was 18,500 with a neutrophil count of 15,000. On 08/10/2019, white blood cell count was 13,000. PAST MEDICAL HISTORY:  Obesity. MEDICATIONS:  Multivitamin, NuvaRing    ALLERGIES:  No known drug allergies. SOCIAL HISTORY:  She denies any tobacco use. Denies any significant alcohol use. Denies any illicit drug use. FAMILY MEDICAL HISTORY:  No history of any hematologic disorders in the family, specifically mother and father. REVIEW OF SYSTEMS:  All other systems reviewed and negative x12. PHYSICAL EXAMINATION:    GENERAL:  No acute distress. Alert and oriented. VITAL SIGNS:  ECOG performance status is 0. Weight is 120 kg. Blood pressure is 146/86, pulse is 90, respiratory rate is 16, pulse ox is 98% on room air, temperature is 37.4. HEENT:  Moist mucous membranes. Oropharynx clear. NECK:  Supple. LUNGS:  Symmetric expansion. HEART:  Good distal pulses. ABDOMEN:  Obese. EXTREMITIES:  No edema.   SKIN:  No visible lesions. LYMPHATICS:  No visible adenopathy. NEUROLOGIC:  Moving all extremities. Cranial nerves intact  PSYCHIATRIC:  Appropriate mood, appropriate affect. MUSCULOSKELETAL:  No deformity or bony tenderness. LABORATORY DATA:  CBC results reviewed as per HPI. Please see above for details. In addition, comprehensive metabolic panel [id number] reviewed showing creatinine of 0.71, bilirubin 0.2. Hemoglobin A1c 09/29/2021 was 6.1. ASSESSMENT AND PLAN:  The patient is a pleasant 81-year-old female with history of obesity being followed by Hematology for leukocytosis/neutrophilia. We reviewed the patient's labs over the last several years. This patient has leukocytosis with only neutrophilia. This is most likely reactive and nonclonal.  Her most recent CBC does not show any evidence of immature dysplastic changes. Given that she does not have any localizing symptoms to suggest infection, there is no immediate workup needed. She can contact her primary care physician if she has any new physical concerns. Otherwise, we will see her back in clinic in 1 year with a CBC. Thank you very much for the consultation request and for allowing us to participate in the care of this delightful patient. Dictated By Randal Owen MD  d: 04/27/2022 16:26:29  t: 04/28/2022 06:37:07  Job [id number]  [facility]/    cc: Michell Hendricks, DO

## 2022-11-05 ENCOUNTER — IMMUNIZATION (OUTPATIENT)
Dept: LAB | Age: 37
End: 2022-11-05
Attending: EMERGENCY MEDICINE
Payer: COMMERCIAL

## 2022-11-05 DIAGNOSIS — Z23 NEED FOR VACCINATION: Primary | ICD-10-CM

## 2022-11-05 PROCEDURE — 90471 IMMUNIZATION ADMIN: CPT

## 2022-11-05 PROCEDURE — 90686 IIV4 VACC NO PRSV 0.5 ML IM: CPT

## 2023-04-26 ENCOUNTER — APPOINTMENT (OUTPATIENT)
Dept: HEMATOLOGY/ONCOLOGY | Facility: HOSPITAL | Age: 38
End: 2023-04-26
Attending: INTERNAL MEDICINE
Payer: COMMERCIAL

## 2023-05-25 ENCOUNTER — OFFICE VISIT (OUTPATIENT)
Dept: OBGYN CLINIC | Facility: CLINIC | Age: 38
End: 2023-05-25

## 2023-05-25 VITALS
DIASTOLIC BLOOD PRESSURE: 85 MMHG | BODY MASS INDEX: 41.86 KG/M2 | HEIGHT: 67.7 IN | WEIGHT: 273 LBS | HEART RATE: 80 BPM | SYSTOLIC BLOOD PRESSURE: 130 MMHG

## 2023-05-25 DIAGNOSIS — Z01.419 ENCOUNTER FOR GYNECOLOGICAL EXAMINATION WITHOUT ABNORMAL FINDING: Primary | ICD-10-CM

## 2023-05-25 DIAGNOSIS — Z76.0 MEDICATION REFILL: ICD-10-CM

## 2023-05-25 PROCEDURE — 87624 HPV HI-RISK TYP POOLED RSLT: CPT | Performed by: OBSTETRICS & GYNECOLOGY

## 2023-05-25 RX ORDER — ETONOGESTREL AND ETHINYL ESTRADIOL 11.7; 2.7 MG/1; MG/1
1 INSERT, EXTENDED RELEASE VAGINAL
Qty: 3 RING | Refills: 3 | Status: SHIPPED | OUTPATIENT
Start: 2023-05-25

## 2023-05-26 LAB — HPV I/H RISK 1 DNA SPEC QL NAA+PROBE: NEGATIVE

## 2023-05-30 LAB — LAST PAP RESULT: NORMAL

## 2023-06-27 DIAGNOSIS — Z76.0 MEDICATION REFILL: ICD-10-CM

## 2023-06-27 RX ORDER — ETONOGESTREL AND ETHINYL ESTRADIOL 11.7; 2.7 MG/1; MG/1
1 INSERT, EXTENDED RELEASE VAGINAL
Qty: 3 RING | Refills: 3 | Status: CANCELLED | OUTPATIENT
Start: 2023-06-27

## 2023-06-30 RX ORDER — ETONOGESTREL AND ETHINYL ESTRADIOL 11.7; 2.7 MG/1; MG/1
1 INSERT, EXTENDED RELEASE VAGINAL
Qty: 3 RING | Refills: 3 | Status: SHIPPED | OUTPATIENT
Start: 2023-06-30

## 2023-11-01 ENCOUNTER — IMMUNIZATION (OUTPATIENT)
Dept: LAB | Age: 38
End: 2023-11-01
Attending: EMERGENCY MEDICINE
Payer: COMMERCIAL

## 2023-11-01 DIAGNOSIS — Z23 NEED FOR VACCINATION: Primary | ICD-10-CM

## 2023-11-01 PROCEDURE — 90686 IIV4 VACC NO PRSV 0.5 ML IM: CPT

## 2023-11-01 PROCEDURE — 90480 ADMN SARSCOV2 VAC 1/ONLY CMP: CPT

## 2023-11-01 PROCEDURE — 90471 IMMUNIZATION ADMIN: CPT

## 2024-05-22 ENCOUNTER — TELEPHONE (OUTPATIENT)
Dept: OBGYN CLINIC | Facility: CLINIC | Age: 39
End: 2024-05-22

## 2024-05-22 DIAGNOSIS — Z76.0 MEDICATION REFILL: ICD-10-CM

## 2024-05-22 NOTE — TELEPHONE ENCOUNTER
Received prescription change request from [facility]. They indicate Nuvaring is not cover and not option for prior authorization. They are asking for alternative.     Eluryng  Enillo Ring  Haloette  etonogestrel ethinyl estradiol ring    To Dr. Manjarrez, please review and advise. Thank you.

## 2024-05-24 RX ORDER — ETONOGESTREL AND ETHINYL ESTRADIOL VAGINAL RING .015; .12 MG/D; MG/D
RING VAGINAL
Qty: 3 RING | Refills: 0 | Status: SHIPPED | OUTPATIENT
Start: 2024-05-24

## 2024-05-24 NOTE — TELEPHONE ENCOUNTER
Ok to substitute the etonogestrel/setradiol ring x 3 months supply and refill until the next annual is due

## 2024-06-03 ENCOUNTER — OFFICE VISIT (OUTPATIENT)
Dept: OBGYN CLINIC | Facility: CLINIC | Age: 39
End: 2024-06-03
Payer: COMMERCIAL

## 2024-06-03 VITALS
BODY MASS INDEX: 40.92 KG/M2 | SYSTOLIC BLOOD PRESSURE: 124 MMHG | HEIGHT: 68 IN | HEART RATE: 91 BPM | WEIGHT: 270 LBS | DIASTOLIC BLOOD PRESSURE: 83 MMHG

## 2024-06-03 DIAGNOSIS — Z30.49 ENCOUNTER FOR SURVEILLANCE OF NUVARING: ICD-10-CM

## 2024-06-03 DIAGNOSIS — Z80.9 FAMILY HISTORY OF CANCER: ICD-10-CM

## 2024-06-03 DIAGNOSIS — Z01.419 WELL WOMAN EXAM WITH ROUTINE GYNECOLOGICAL EXAM: Primary | ICD-10-CM

## 2024-06-03 PROCEDURE — 3079F DIAST BP 80-89 MM HG: CPT | Performed by: NURSE PRACTITIONER

## 2024-06-03 PROCEDURE — 3008F BODY MASS INDEX DOCD: CPT | Performed by: NURSE PRACTITIONER

## 2024-06-03 PROCEDURE — 99395 PREV VISIT EST AGE 18-39: CPT | Performed by: NURSE PRACTITIONER

## 2024-06-03 PROCEDURE — 3074F SYST BP LT 130 MM HG: CPT | Performed by: NURSE PRACTITIONER

## 2024-06-03 RX ORDER — ETONOGESTREL AND ETHINYL ESTRADIOL VAGINAL RING .015; .12 MG/D; MG/D
RING VAGINAL
Qty: 3 RING | Refills: 4 | Status: SHIPPED | OUTPATIENT
Start: 2024-06-03

## 2024-06-03 NOTE — PROGRESS NOTES
[facility]    Obstetrics and Gynecology    Chief Complaint   Patient presents with    Gyn Exam     ANNUAL EXAM / BC REFILL       Kasey Webber is a 38 year old female  Patient's last menstrual period was 2024. presenting for annual gynecology exam.  On nuvaring. Regular cycles. Desires to continue. No concerns today. Has increased physical activity  Sexually active with same partner. No concerns with intercourse.  Family history or colon, prostate, and ovarian cancer on her dad's side.    Pap:2023 NILM, negative human papillomavirus  No history of abnormal pap     Contraception:nuva ring  Mammo: none  Colonoscopy: n/a    OBSTETRICS HISTORY:  OB History    Para Term  AB Living   1 1 1 0 0 1   SAB IAB Ectopic Multiple Live Births   0 0 0 0 1       GYNE HISTORY:  Menarche: 16 y/o (6/3/2024  9:23 AM)  Period Cycle (Days): 28 (6/3/2024  9:23 AM)  Period Duration (Days): 3-4 (6/3/2024  9:23 AM)  Period Flow: light (6/3/2024  9:23 AM)  Use of Birth Control (if yes, specify type): Nuvaring (6/3/2024  9:23 AM)  Hx Prior Abnormal Pap: No (6/3/2024  9:23 AM)  Pap Date: 23 (6/3/2024  9:23 AM)  Pap Result Notes: NEG PAP / HPV neg (6/3/2024  9:23 AM)      History   Sexual Activity    Sexual activity: Yes    Partners: Male    Birth control/ protection: Inserts     Comment: Nuvaring           Latest Ref Rng & Units 2023     1:18 PM 2019     1:13 PM   RECENT PAP RESULTS   Thinprep Pap Negative for intraepithelial lesion or malignancy Negative for intraepithelial lesion or malignancy  Negative for intraepithelial lesion or malignancy    HPV Negative Negative  Negative          MEDICAL HISTORY:  Past Medical History:    Decorative tattoo    Varicella    Had during childhood     Past Surgical History:   Procedure Laterality Date             SOCIAL HISTORY:  Social History     Socioeconomic History    Marital status:      Spouse name: Not on file    Number of  children: Not on file    Years of education: Not on file    Highest education level: Not on file   Occupational History    Not on file   Tobacco Use    Smoking status: Never    Smokeless tobacco: Never   Substance and Sexual Activity    Alcohol use: No     Alcohol/week: 0.0 standard drinks of alcohol     Comment: Before pregnancy would have a drink once a year.     Drug use: No    Sexual activity: Yes     Partners: Male     Birth control/protection: Inserts     Comment: Nuvaring   Other Topics Concern     Service Not Asked    Blood Transfusions Not Asked    Caffeine Concern Yes     Comment: occasional coffee    Occupational Exposure Not Asked    Hobby Hazards Not Asked    Sleep Concern Not Asked    Stress Concern Not Asked    Weight Concern Not Asked    Special Diet Not Asked    Back Care Not Asked    Exercise Not Asked    Bike Helmet Not Asked    Seat Belt Not Asked    Self-Exams Not Asked    Left Handed No    Right Handed Yes    Currently spends a great deal of time in the sun No    Past Sunlamp Treatments for Acne Not Asked    History of tanning No    Hx of Spending Great Deal of Time in Sun No    Bad sunburns in the past No    Tanning Salons in the Past No    Hx of Radiation Treatments No    Regular use of sun block Not Asked   Social History Narrative    Not on file     Social Determinants of Health     Financial Resource Strain: Not on file   Food Insecurity: Not on file   Transportation Needs: Not on file   Physical Activity: Not on file   Stress: Not on file   Social Connections: Not on file   Housing Stability: Not on file         Depression Screening (PHQ-2/PHQ-9): Over the LAST 2 WEEKS   Little interest or pleasure in doing things: Not at all    Feeling down, depressed, or hopeless: Not at all    PHQ-2 SCORE: 0           FAMILY HISTORY:  Family History   Problem Relation Age of Onset    Ovarian Cancer Paternal Grandmother     Hypertension Mother     Other (Other) Mother         uterine prolapse  and YELENA    Prostate Cancer Father     Colon Cancer Father 59    Stroke Maternal Grandfather         CVA       MEDICATIONS:    Current Outpatient Medications:     Etonogestrel-Ethinyl Estradiol 0.12-0.015 MG/24HR Vaginal Ring, Place 1 ring vaginally for 3 weeks, remove for 4th week for menses., Disp: 3 Ring, Rfl: 4    Multiple Vitamin (ONE-DAILY MULTI VITAMINS) Oral Tab, Take 1 tablet by mouth daily., Disp: , Rfl:     ALLERGIES:  No Known Allergies      Review of Systems:  Constitutional:  Denies fatigue, night sweats, hot flashes  Eyes:  denies blurred or double vision  Cardiovascular:  denies chest pain or palpitations  Respiratory:  denies shortness of breath  Gastrointestinal:  denies heartburn, abdominal pain, diarrhea or constipation  Genitourinary:  denies dysuria, incontinence, abnormal vaginal discharge, vaginal itching,   Musculoskeletal:  denies back pain   Skin/Breast:  Denies any breast pain, lumps, or discharge.   Neurological:  denies headaches, extremity weakness or numbness.  Psychiatric: denies depression or anxiety.  Endocrine:   denies excessive thirst or urination.  Heme/Lymph:  denies history of anemia, easy bruising or bleeding.      PHYSICAL EXAM:     Vitals:    06/03/24 0924   BP: 124/83   Pulse: 91   Weight: 270 lb (122.5 kg)   Height: 5' 8\" (1.727 m)       Body mass index is 41.05 kg/m².     Patient offered chaperone, patient declined.     Constitutional: well developed, well nourished  Psychiatric:  Oriented to time, place, person and situation. Appropriate mood and affect  Head/Face: normocephalic  Neck/Thyroid: thyroid symmetric, no thyromegaly, no nodules, no adenopathy  Lymphatic:no abnormal supraclavicular or axillary adenopathy is noted  Breast: normal without palpable masses, tenderness, asymmetry, nipple discharge, nipple retraction or skin changes  Abdomen:  soft, nontender, nondistended, no masses  Skin/Hair: no unusual rashes or bruises  Extremities: no edema, no  cyanosis    Pelvic Exam:  External Genitalia: normal appearance, hair distribution, and no lesions  Urethral Meatus:  normal in size, location, without lesions and prolapse  Bladder:  No fullness, masses or tenderness  Vagina:  Normal appearance without lesions, no abnormal discharge  Cervix:  Normal without tenderness on motion  Uterus: normal in size, contour, position, mobility, without tenderness  Adnexa: normal without masses or tenderness  Perineum: normal  Anus: no hemorroids     Assessment & Plan:    ICD-10-CM    1. Well woman exam with routine gynecological exam  Z01.419       2. Family history of cancer  Z80.9 Genetic Counselor Referral - [facility] (Serina Howell)      3. Encounter for surveillance of nuvaring  Z30.49          Reviewed ASCCP guidelines with the patient   Pap UTD2  Contraception: Using nuva ring. Desires to continue. Rev'd risks, benefits and SE, including DVT/PE  Family history of cancer - referred to genetics.  Breast Health:     Reviewed current guidelines with the patient and to start Mammograms at age 40  Reviewed monthly self breast exams with the patient   Discussed diet, exercise, MVIs with Ca/Vit D  Follow up in 1 yr for [facility] [place]    This note was prepared using Dragon Medical voice recognition dictation software. As a result errors may occur. When identified these errors have been corrected. While every attempt is made to correct errors during dictation discrepancies may still exist.

## 2024-06-07 ENCOUNTER — TELEPHONE (OUTPATIENT)
Dept: GENETICS | Facility: HOSPITAL | Age: 39
End: 2024-06-07

## 2024-06-19 ENCOUNTER — TELEPHONE (OUTPATIENT)
Dept: GENETICS | Facility: HOSPITAL | Age: 39
End: 2024-06-19

## (undated) NOTE — Clinical Note
Continue care with Dr. Cruz Royal ultrasound at 32 weeks. Weekly NST's at 36 weeks. Limit weight gain to 11-20 pounds

## (undated) NOTE — LETTER
VACCINE ADMINISTRATION RECORD  PARENT / GUARDIAN APPROVAL  Date: 2019  Vaccine administered to: Damien Hawkins     : 1985    MRN: [id number]    A copy of the appropriate Centers for Disease Control and Prevention Vaccine Information statement

## (undated) NOTE — LETTER
19      Patient: Rico Sasm  : 1985 Visit date: 2019    Dear Dirk Fragoso,      I examined your patient in consultation today. She has bilateral de Quervain's, left much worse than right.     We performed a steroid inj